# Patient Record
Sex: FEMALE | Race: WHITE | NOT HISPANIC OR LATINO | URBAN - METROPOLITAN AREA
[De-identification: names, ages, dates, MRNs, and addresses within clinical notes are randomized per-mention and may not be internally consistent; named-entity substitution may affect disease eponyms.]

---

## 2019-02-02 ENCOUNTER — EMERGENCY (EMERGENCY)
Facility: HOSPITAL | Age: 29
LOS: 1 days | Discharge: HOME | End: 2019-02-02
Attending: EMERGENCY MEDICINE | Admitting: EMERGENCY MEDICINE
Payer: MEDICAID

## 2019-02-02 VITALS
OXYGEN SATURATION: 100 % | SYSTOLIC BLOOD PRESSURE: 112 MMHG | RESPIRATION RATE: 16 BRPM | DIASTOLIC BLOOD PRESSURE: 74 MMHG | HEART RATE: 72 BPM | TEMPERATURE: 98 F

## 2019-02-02 VITALS
RESPIRATION RATE: 18 BRPM | WEIGHT: 125 LBS | DIASTOLIC BLOOD PRESSURE: 85 MMHG | SYSTOLIC BLOOD PRESSURE: 141 MMHG | HEART RATE: 91 BPM | TEMPERATURE: 98 F | OXYGEN SATURATION: 99 %

## 2019-02-02 DIAGNOSIS — Z79.899 OTHER LONG TERM (CURRENT) DRUG THERAPY: ICD-10-CM

## 2019-02-02 DIAGNOSIS — R10.9 UNSPECIFIED ABDOMINAL PAIN: ICD-10-CM

## 2019-02-02 DIAGNOSIS — R10.32 LEFT LOWER QUADRANT PAIN: ICD-10-CM

## 2019-02-02 LAB
ALBUMIN SERPL ELPH-MCNC: 4.5 G/DL — SIGNIFICANT CHANGE UP (ref 3.3–5)
ALP SERPL-CCNC: 63 U/L — SIGNIFICANT CHANGE UP (ref 40–120)
ALT FLD-CCNC: 18 U/L — SIGNIFICANT CHANGE UP (ref 10–45)
ANION GAP SERPL CALC-SCNC: 13 MMOL/L — SIGNIFICANT CHANGE UP (ref 5–17)
APPEARANCE UR: CLEAR — SIGNIFICANT CHANGE UP
APTT BLD: 33.3 SEC — SIGNIFICANT CHANGE UP (ref 27.5–36.3)
AST SERPL-CCNC: 21 U/L — SIGNIFICANT CHANGE UP (ref 10–40)
BASOPHILS NFR BLD AUTO: 0.5 % — SIGNIFICANT CHANGE UP (ref 0–2)
BILIRUB SERPL-MCNC: 0.4 MG/DL — SIGNIFICANT CHANGE UP (ref 0.2–1.2)
BILIRUB UR-MCNC: NEGATIVE — SIGNIFICANT CHANGE UP
BUN SERPL-MCNC: 16 MG/DL — SIGNIFICANT CHANGE UP (ref 7–23)
CALCIUM SERPL-MCNC: 9.8 MG/DL — SIGNIFICANT CHANGE UP (ref 8.4–10.5)
CHLORIDE SERPL-SCNC: 102 MMOL/L — SIGNIFICANT CHANGE UP (ref 96–108)
CO2 SERPL-SCNC: 24 MMOL/L — SIGNIFICANT CHANGE UP (ref 22–31)
COLOR SPEC: YELLOW — SIGNIFICANT CHANGE UP
CREAT SERPL-MCNC: 0.91 MG/DL — SIGNIFICANT CHANGE UP (ref 0.5–1.3)
DIFF PNL FLD: ABNORMAL
EOSINOPHIL NFR BLD AUTO: 3.4 % — SIGNIFICANT CHANGE UP (ref 0–6)
GLUCOSE SERPL-MCNC: 78 MG/DL — SIGNIFICANT CHANGE UP (ref 70–99)
GLUCOSE UR QL: NEGATIVE — SIGNIFICANT CHANGE UP
HCG SERPL-ACNC: <.1 MIU/ML — SIGNIFICANT CHANGE UP
HCT VFR BLD CALC: 43.7 % — SIGNIFICANT CHANGE UP (ref 34.5–45)
HGB BLD-MCNC: 15.1 G/DL — SIGNIFICANT CHANGE UP (ref 11.5–15.5)
INR BLD: 1.05 — SIGNIFICANT CHANGE UP (ref 0.88–1.16)
KETONES UR-MCNC: 15 MG/DL
LEUKOCYTE ESTERASE UR-ACNC: NEGATIVE — SIGNIFICANT CHANGE UP
LIDOCAIN IGE QN: 25 U/L — SIGNIFICANT CHANGE UP (ref 7–60)
LYMPHOCYTES # BLD AUTO: 31.5 % — SIGNIFICANT CHANGE UP (ref 13–44)
MCHC RBC-ENTMCNC: 28.8 PG — SIGNIFICANT CHANGE UP (ref 27–34)
MCHC RBC-ENTMCNC: 34.6 G/DL — SIGNIFICANT CHANGE UP (ref 32–36)
MCV RBC AUTO: 83.4 FL — SIGNIFICANT CHANGE UP (ref 80–100)
MONOCYTES NFR BLD AUTO: 8.1 % — SIGNIFICANT CHANGE UP (ref 2–14)
NEUTROPHILS NFR BLD AUTO: 56.5 % — SIGNIFICANT CHANGE UP (ref 43–77)
NITRITE UR-MCNC: NEGATIVE — SIGNIFICANT CHANGE UP
PH UR: 6 — SIGNIFICANT CHANGE UP (ref 5–8)
PLATELET # BLD AUTO: 414 K/UL — HIGH (ref 150–400)
POTASSIUM SERPL-MCNC: 4.2 MMOL/L — SIGNIFICANT CHANGE UP (ref 3.5–5.3)
POTASSIUM SERPL-SCNC: 4.2 MMOL/L — SIGNIFICANT CHANGE UP (ref 3.5–5.3)
PROT SERPL-MCNC: 8.4 G/DL — HIGH (ref 6–8.3)
PROT UR-MCNC: NEGATIVE MG/DL — SIGNIFICANT CHANGE UP
PROTHROM AB SERPL-ACNC: 11.9 SEC — SIGNIFICANT CHANGE UP (ref 10–12.9)
RBC # BLD: 5.24 M/UL — HIGH (ref 3.8–5.2)
RBC # FLD: 12.8 % — SIGNIFICANT CHANGE UP (ref 10.3–16.9)
SODIUM SERPL-SCNC: 139 MMOL/L — SIGNIFICANT CHANGE UP (ref 135–145)
SP GR SPEC: >=1.03 — SIGNIFICANT CHANGE UP (ref 1–1.03)
UROBILINOGEN FLD QL: 0.2 E.U./DL — SIGNIFICANT CHANGE UP
WBC # BLD: 8.6 K/UL — SIGNIFICANT CHANGE UP (ref 3.8–10.5)
WBC # FLD AUTO: 8.6 K/UL — SIGNIFICANT CHANGE UP (ref 3.8–10.5)

## 2019-02-02 PROCEDURE — 76857 US EXAM PELVIC LIMITED: CPT | Mod: 26

## 2019-02-02 PROCEDURE — 85610 PROTHROMBIN TIME: CPT

## 2019-02-02 PROCEDURE — 96374 THER/PROPH/DIAG INJ IV PUSH: CPT | Mod: XU

## 2019-02-02 PROCEDURE — 85730 THROMBOPLASTIN TIME PARTIAL: CPT

## 2019-02-02 PROCEDURE — 76830 TRANSVAGINAL US NON-OB: CPT

## 2019-02-02 PROCEDURE — 87086 URINE CULTURE/COLONY COUNT: CPT

## 2019-02-02 PROCEDURE — 85025 COMPLETE CBC W/AUTO DIFF WBC: CPT

## 2019-02-02 PROCEDURE — 87591 N.GONORRHOEAE DNA AMP PROB: CPT

## 2019-02-02 PROCEDURE — 83690 ASSAY OF LIPASE: CPT

## 2019-02-02 PROCEDURE — 81001 URINALYSIS AUTO W/SCOPE: CPT

## 2019-02-02 PROCEDURE — 36415 COLL VENOUS BLD VENIPUNCTURE: CPT

## 2019-02-02 PROCEDURE — 99284 EMERGENCY DEPT VISIT MOD MDM: CPT | Mod: 25

## 2019-02-02 PROCEDURE — 74177 CT ABD & PELVIS W/CONTRAST: CPT

## 2019-02-02 PROCEDURE — 87491 CHLMYD TRACH DNA AMP PROBE: CPT

## 2019-02-02 PROCEDURE — 84702 CHORIONIC GONADOTROPIN TEST: CPT

## 2019-02-02 PROCEDURE — 80053 COMPREHEN METABOLIC PANEL: CPT

## 2019-02-02 PROCEDURE — 74177 CT ABD & PELVIS W/CONTRAST: CPT | Mod: 26

## 2019-02-02 PROCEDURE — 76857 US EXAM PELVIC LIMITED: CPT

## 2019-02-02 PROCEDURE — 76830 TRANSVAGINAL US NON-OB: CPT | Mod: 26

## 2019-02-02 RX ORDER — KETOROLAC TROMETHAMINE 30 MG/ML
30 SYRINGE (ML) INJECTION ONCE
Qty: 0 | Refills: 0 | Status: DISCONTINUED | OUTPATIENT
Start: 2019-02-02 | End: 2019-02-02

## 2019-02-02 RX ORDER — IOHEXOL 300 MG/ML
30 INJECTION, SOLUTION INTRAVENOUS ONCE
Qty: 0 | Refills: 0 | Status: COMPLETED | OUTPATIENT
Start: 2019-02-02 | End: 2019-02-02

## 2019-02-02 RX ORDER — TRAMADOL HYDROCHLORIDE 50 MG/1
50 TABLET ORAL ONCE
Qty: 0 | Refills: 0 | Status: DISCONTINUED | OUTPATIENT
Start: 2019-02-02 | End: 2019-02-02

## 2019-02-02 RX ADMIN — IOHEXOL 30 MILLILITER(S): 300 INJECTION, SOLUTION INTRAVENOUS at 06:48

## 2019-02-02 RX ADMIN — Medication 30 MILLIGRAM(S): at 07:02

## 2019-02-02 RX ADMIN — Medication 30 MILLIGRAM(S): at 05:34

## 2019-02-02 RX ADMIN — TRAMADOL HYDROCHLORIDE 50 MILLIGRAM(S): 50 TABLET ORAL at 07:02

## 2019-02-02 NOTE — ED ADULT NURSE NOTE - CHPI ED NUR SYMPTOMS NEG
no hematuria/no diarrhea/no vomiting/no fever/no dysuria/no blood in stool/no burning urination/no chills/no abdominal distension/no nausea

## 2019-02-02 NOTE — ED ADULT NURSE NOTE - OBJECTIVE STATEMENT
Presents to ED for LLQ pain which began earlier this morning.  Patient reports she was tossing and turning in her sleep due to the pain which then prompted her to come to the hospital.  At the initial time pain began she rated it 8/10, while in ED she rates the pain 4-5/10 but states pain comes in waves.  Denies any hematuria, constipation, diarrhea, blood in stool, incontinence, SOB, CP, N/V.

## 2019-02-02 NOTE — ED PROVIDER NOTE - OBJECTIVE STATEMENT
28F no PMH c/o LLQ abd pain. pt states pain ongoing for past few hours. states sharp in nature. tried tums without relief.  no n/v/d. no fevers. no dizziness. no dysuria, no discharge. no recent travel. no sick contacts.  states has had abd pain in the past but that was more diffuse.

## 2019-02-02 NOTE — ED ADULT NURSE NOTE - NSIMPLEMENTINTERV_GEN_ALL_ED
Implemented All Universal Safety Interventions:  [place] to call system. Call bell, personal items and telephone within reach. Instruct patient to call for assistance. Room bathroom lighting operational. Non-slip footwear when patient is off stretcher. Physically safe environment: no spills, clutter or unnecessary equipment. Stretcher in lowest position, wheels locked, appropriate side rails in place.

## 2019-02-02 NOTE — ED PROVIDER NOTE - PROGRESS NOTE DETAILS
no acute abnormalities on US.  L adnexal tenderness on pelvic exam.  pt states had unprotected sex a week ago.  had discussion with pt regarding possible treatement for PID. pt offered CT (states had unremarkable one a month ago).  pt would like to do CT at this time and hold off on ABX for PID. would like to wait for gc/chlam results. louise: pt received at sign out from dr ceja as pending ct abd- no acute surg findings, constipation and some proctocolitis, pt sleeping comfortably, diet modifications and f/u w/gi or pmd discussed at length

## 2019-02-02 NOTE — ED PROVIDER NOTE - NSFOLLOWUPINSTRUCTIONS_ED_ALL_ED_FT
WHAT YOU NEED TO KNOW:  Constipation is when you have hard, dry bowel movements, or you go longer than usual between bowel movements.   DISCHARGE INSTRUCTIONS:  Return to the emergency department if:   You have blood in your bowel movements.  You have a fever and abdominal pain with the constipation.  Contact your healthcare provider if:   Your constipation gets worse.   You start to vomit.  You have questions or concerns about your condition or care.  Medicines:   Medicine such as a laxative may help relax and loosen your intestines to help you have a bowel movement. Your provider may recommend you only use laxatives for a short time. Long-term use may make your bowels dependent on the medicine.  Take your medicine as directed. Contact your healthcare provider if you think your medicine is not helping or if you have side effects. Tell him of her if you are allergic to any medicine. Keep a list of the medicines, vitamins, and herbs you take. Include the amounts, and when and why you take them. Bring the list or the pill bottles to follow-up visits. Carry your medicine list with you in case of an emergency.  Relieve constipation: A suppository may be used to help soften your bowel movements. This may make them easier to pass. A suppository is guided into your rectum through your anus.Suppository for Constipation   an enema is liquid medicine used to clear bowel movement from your rectum. The medicine is put into your rectum through your anus.Enemas   Prevent constipation:   Drink liquids as directed. You may need to drink extra liquids to help soften and move your bowels. Ask how much liquid to drink each day and which liquids are best for you.   Eat high-fiber foods. This may help decrease constipation by adding bulk to your bowel movements. High-fiber foods include fruit, vegetables, whole-grain breads and cereals, and beans. Your healthcare provider or dietitian can help you create a high-fiber meal plan. Your provider may also recommend a fiber supplement if you cannot get enough fiber from food.   Exercise regularly. Regular physical activity can help stimulate your intestines. Ask which exercises are best for you.      Schedule a time each day to have a bowel movement. This may help train your body to have regular bowel movements. Bend forward while you are on the toilet to help move the bowel movement out. Sit on the toilet for at least 10 minutes, even if you do not have a bowel movement.

## 2019-02-03 LAB
CULTURE RESULTS: SIGNIFICANT CHANGE UP
SPECIMEN SOURCE: SIGNIFICANT CHANGE UP

## 2019-02-04 LAB
C TRACH RRNA SPEC QL NAA+PROBE: DETECTED
N GONORRHOEA RRNA SPEC QL NAA+PROBE: SIGNIFICANT CHANGE UP
SPECIMEN SOURCE: SIGNIFICANT CHANGE UP

## 2019-02-05 NOTE — ED POST DISCHARGE NOTE - DETAILS
mailbox full, unable to leave message. advised one additional attempt, then send letter if unsuccessful pt contacted and aware- encouraged to come back to ed for treatment- pt agreeable to plan

## 2019-02-12 ENCOUNTER — EMERGENCY (EMERGENCY)
Facility: HOSPITAL | Age: 29
LOS: 1 days | Discharge: HOME | End: 2019-02-12
Admitting: EMERGENCY MEDICINE
Payer: MEDICAID

## 2019-02-12 VITALS
HEART RATE: 98 BPM | SYSTOLIC BLOOD PRESSURE: 122 MMHG | RESPIRATION RATE: 18 BRPM | OXYGEN SATURATION: 97 % | TEMPERATURE: 98 F | DIASTOLIC BLOOD PRESSURE: 81 MMHG

## 2019-02-12 DIAGNOSIS — A74.9 CHLAMYDIAL INFECTION, UNSPECIFIED: ICD-10-CM

## 2019-02-12 DIAGNOSIS — Z79.899 OTHER LONG TERM (CURRENT) DRUG THERAPY: ICD-10-CM

## 2019-02-12 DIAGNOSIS — R10.9 UNSPECIFIED ABDOMINAL PAIN: ICD-10-CM

## 2019-02-12 PROCEDURE — 99283 EMERGENCY DEPT VISIT LOW MDM: CPT | Mod: 25

## 2019-02-12 PROCEDURE — 96372 THER/PROPH/DIAG INJ SC/IM: CPT

## 2019-02-12 PROCEDURE — 99283 EMERGENCY DEPT VISIT LOW MDM: CPT

## 2019-02-12 RX ORDER — CEFTRIAXONE 500 MG/1
250 INJECTION, POWDER, FOR SOLUTION INTRAMUSCULAR; INTRAVENOUS ONCE
Qty: 0 | Refills: 0 | Status: COMPLETED | OUTPATIENT
Start: 2019-02-12 | End: 2019-02-12

## 2019-02-12 RX ORDER — AZITHROMYCIN 500 MG/1
1000 TABLET, FILM COATED ORAL ONCE
Qty: 0 | Refills: 0 | Status: COMPLETED | OUTPATIENT
Start: 2019-02-12 | End: 2019-02-12

## 2019-02-12 RX ADMIN — AZITHROMYCIN 1000 MILLIGRAM(S): 500 TABLET, FILM COATED ORAL at 21:55

## 2019-02-12 RX ADMIN — CEFTRIAXONE 250 MILLIGRAM(S): 500 INJECTION, POWDER, FOR SOLUTION INTRAMUSCULAR; INTRAVENOUS at 21:57

## 2019-02-12 NOTE — ED ADULT TRIAGE NOTE - CHIEF COMPLAINT QUOTE
Pt states "I got a call from the MD here saying that I was tested positive for Chlamydia and that I should come in for antibiotics". Denies other complaints

## 2019-02-12 NOTE — ED PROVIDER NOTE - NSFOLLOWUPINSTRUCTIONS_ED_ALL_ED_FT
Chlamydia, Female  Chlamydia is an STD (sexually transmitted disease). It is a bacterial infection that spreads (is contagious) through sexual contact. Chlamydia can occur in different areas of the body, including:    The tube that moves urine from the bladder out of the body (urethra).  The lower part of the uterus (cervix).  The throat.  The rectum.    ImageThis condition is not difficult to treat. However, if left untreated, chlamydia can lead to more serious health problems, including pelvic inflammatory disorder (PID). PID can increase your risk of not being able to have children (sterility).    What are the causes?  Chlamydia is caused by the bacteria Chlamydia trachomatis. It is passed from an infected partner during sexual activity. Chlamydia can spread through contact with the genitals, mouth, or rectum.    What are the signs or symptoms?  In some cases, there may not be any symptoms for this condition (asymptomatic), especially early in the infection. If symptoms develop, they may include:    Burning with urination.  Frequent urination.  Vaginal discharge.  Redness, soreness, and swelling (inflammation) of the rectum.  Bleeding or discharge from the rectum.  Abdominal pain.  Pain during sexual intercourse.  Bleeding between menstrual periods.  Itching, burning, or redness in the eyes, or discharge from the eyes.    How is this diagnosed?  This condition may be diagnosed with:    Urine tests.  Swab tests. Depending on your symptoms, your health care provider may use a cotton swab to collect discharge from your vagina or rectum to test for the bacteria.  A pelvic exam.    How is this treated?  This condition is treated with antibiotic medicines. If you are pregnant, certain types of antibiotics will need to be avoided.    Follow these instructions at home:  Medicines     Take over-the-counter and prescription medicines only as told by your health care provider.  Take your antibiotic medicine as told by your health care provider. Do not stop taking the antibiotic even if you start to feel better.  Sexual activity     Tell sexual partners about your infection. This includes any oral, anal, or vaginal sex partners you have had within 60 days of when your symptoms started. Sexual partners should also be treated, even if they have no signs of the disease.  Do not have sex until you and your sexual partners have completed treatment and your health care provider says it is okay. If your health care provider prescribed you a single dose treatment, wait 7 days after taking the treatment before having sex.  General instructions     It is your responsibility to get your test results. Ask your health care provider, or the department performing the test, when your results will be ready.  Get plenty of rest.  Eat a healthy, well-balanced diet.  Drink enough fluids to keep your urine clear or pale yellow.  Keep all follow-up visits as told by your health care provider. This is important. You may need to be tested for infection again 3 months after treatment.  How is this prevented?  The only sure way to prevent chlamydia is to avoid having sex. However, you can lower your risk by:    Using latex condoms correctly every time you have sex.  Not having multiple sexual partners.  Asking if your sexual partner has been tested for STIs and had negative results.    Contact a health care provider if:  You develop new symptoms or your symptoms do not get better after completing treatment.  You have a fever or chills.  You have pain during sexual intercourse.  Get help right away if:  Your pain gets worse and does not get better with medicine.  You develop flu-like symptoms, such as night sweats, sore throat, or muscle aches.  You experience nausea or vomiting.  You have difficulty swallowing.  You have bleeding between periods or after sex.  You have irregular menstrual periods.  You have abdominal or lower back pain that does not get better with medicine.  You feel weak or dizzy, or you faint.  You are pregnant and you develop symptoms of chlamydia.  Summary  Chlamydia is an STD (sexually transmitted disease). It is a bacterial infection that spreads (is contagious) through sexual contact.  This condition is not difficult to treat, however. If left untreated, chlamydia can lead to more serious health problems, including pelvic inflammatory disease (PID).  In some cases, there may not be any symptoms for this condition (asymptomatic).  This condition is treated with antibiotic medicines.  Using latex condoms correctly every time you have sex can help prevent chlamydia.  This information is not intended to replace advice given to you by your health care provider. Make sure you discuss any questions you have with your health care provider.

## 2019-02-12 NOTE — ED PROVIDER NOTE - PHYSICAL EXAMINATION
General: Patient is well developed and well nourised. Patient is alert and oriented to person, place and date. Patient is laying comfortably in stretcher and appears in no acute distress.  HEENT: Head is normocephalic and atraumatic. Pupils are equal, round and reactive. Extraocular movements intact.  Lungs: Equal chest expansion. No note of retractions.  Abdomen: Bowel sounds present in all four quadrants. Soft, non-tender, non-distended without signs of masses, rebound or guarding. No note of hepatosplenomegaly. No CVA tenderness bilaterally. Negative Smith sign. No pain present over McBurney's point.  Musculoskeletal: No edema, erythema, ecchymosis, atrophy or deformity. Full range of motion in all four extremities.  No clubbing or cyanosis. No point tenderness to palpation.   Neuro: GCS 15. Moving all extremities without discomfort. gait steady   Skin: Warm, dry and intact without evidence of rashes, bruising, pallor, jaundice or cyanosis.   Psych: Mood and affect appropriate.

## 2019-02-12 NOTE — ED PROVIDER NOTE - CLINICAL SUMMARY MEDICAL DECISION MAKING FREE TEXT BOX
This is a pleasant 28 year old female presenting to the ed for treatment of chlamydia. patient was called this evening and notified she was positive. no changes in symptoms, abdominal pain, fevers or discharge. I reviewed the patient's prior ed visit which patient had negative findings for tuboovarian abscess on ct and us. patient is medicated and encouraged to remain abstinent and follow up with obgyn. ED evaluation and management discussed with the patient and family (if available) in detail.  Close PMD follow up encouraged.  Strict ED return instructions discussed in detail and patient given the opportunity to ask any questions about their discharge diagnosis and instructions. Patient verbalized understanding. Patient is agreeable to plan.

## 2019-02-12 NOTE — ED PROVIDER NOTE - OBJECTIVE STATEMENT
Patient seen and evaluated on 2/2 for abdominal patient. patient was contacted today that she was positive for chlamydia. states that prior to ed eval 2/2, she had unprotected intercourse 1 week prior. states that her abdominal pain has otherwise subsided, no painful urination change in vaginal discharge. states that she is otherwise feeling well and here for treatment.

## 2019-02-12 NOTE — ED ADULT NURSE NOTE - NSIMPLEMENTINTERV_GEN_ALL_ED
Implemented All Universal Safety Interventions:  [place] to call system. Call bell, personal items and telephone within reach. Instruct patient to call for assistance. Room bathroom lighting operational. Non-slip footwear when patient is off stretcher. Physically safe environment: no spills, clutter or unnecessary equipment. Stretcher in lowest position, wheels locked, appropriate side rails in place.

## 2019-12-02 ENCOUNTER — INPATIENT (INPATIENT)
Facility: HOSPITAL | Age: 29
LOS: 2 days | Discharge: HOME | DRG: 872 | End: 2019-12-05
Attending: INTERNAL MEDICINE | Admitting: INTERNAL MEDICINE
Payer: COMMERCIAL

## 2019-12-02 VITALS
WEIGHT: 129.19 LBS | OXYGEN SATURATION: 98 % | DIASTOLIC BLOOD PRESSURE: 78 MMHG | SYSTOLIC BLOOD PRESSURE: 116 MMHG | RESPIRATION RATE: 18 BRPM | TEMPERATURE: 102 F | HEIGHT: 64 IN | HEART RATE: 120 BPM

## 2019-12-02 DIAGNOSIS — F32.9 MAJOR DEPRESSIVE DISORDER, SINGLE EPISODE, UNSPECIFIED: ICD-10-CM

## 2019-12-02 DIAGNOSIS — Z91.89 OTHER SPECIFIED PERSONAL RISK FACTORS, NOT ELSEWHERE CLASSIFIED: ICD-10-CM

## 2019-12-02 DIAGNOSIS — N12 TUBULO-INTERSTITIAL NEPHRITIS, NOT SPECIFIED AS ACUTE OR CHRONIC: ICD-10-CM

## 2019-12-02 DIAGNOSIS — F41.9 ANXIETY DISORDER, UNSPECIFIED: ICD-10-CM

## 2019-12-02 DIAGNOSIS — R63.8 OTHER SYMPTOMS AND SIGNS CONCERNING FOOD AND FLUID INTAKE: ICD-10-CM

## 2019-12-02 DIAGNOSIS — Z86.19 PERSONAL HISTORY OF OTHER INFECTIOUS AND PARASITIC DISEASES: ICD-10-CM

## 2019-12-02 DIAGNOSIS — A41.9 SEPSIS, UNSPECIFIED ORGANISM: ICD-10-CM

## 2019-12-02 LAB — HCG SERPL-ACNC: <0 MIU/ML — SIGNIFICANT CHANGE UP

## 2019-12-02 PROCEDURE — 93010 ELECTROCARDIOGRAM REPORT: CPT | Mod: NC

## 2019-12-02 PROCEDURE — 99285 EMERGENCY DEPT VISIT HI MDM: CPT

## 2019-12-02 PROCEDURE — 76705 ECHO EXAM OF ABDOMEN: CPT | Mod: 26

## 2019-12-02 PROCEDURE — 74176 CT ABD & PELVIS W/O CONTRAST: CPT | Mod: 26

## 2019-12-02 RX ORDER — INFLUENZA VIRUS VACCINE 15; 15; 15; 15 UG/.5ML; UG/.5ML; UG/.5ML; UG/.5ML
0.5 SUSPENSION INTRAMUSCULAR ONCE
Refills: 0 | Status: COMPLETED | OUTPATIENT
Start: 2019-12-02 | End: 2019-12-02

## 2019-12-02 RX ORDER — ACETAMINOPHEN 500 MG
650 TABLET ORAL EVERY 6 HOURS
Refills: 0 | Status: DISCONTINUED | OUTPATIENT
Start: 2019-12-02 | End: 2019-12-05

## 2019-12-02 RX ORDER — ACETAMINOPHEN 500 MG
650 TABLET ORAL ONCE
Refills: 0 | Status: COMPLETED | OUTPATIENT
Start: 2019-12-02 | End: 2019-12-02

## 2019-12-02 RX ORDER — SODIUM CHLORIDE 9 MG/ML
1000 INJECTION INTRAMUSCULAR; INTRAVENOUS; SUBCUTANEOUS
Refills: 0 | Status: DISCONTINUED | OUTPATIENT
Start: 2019-12-02 | End: 2019-12-04

## 2019-12-02 RX ORDER — ACETAMINOPHEN 500 MG
325 TABLET ORAL ONCE
Refills: 0 | Status: COMPLETED | OUTPATIENT
Start: 2019-12-02 | End: 2019-12-02

## 2019-12-02 RX ORDER — BUPROPION HYDROCHLORIDE 150 MG/1
150 TABLET, EXTENDED RELEASE ORAL
Qty: 0 | Refills: 0 | DISCHARGE

## 2019-12-02 RX ORDER — VENLAFAXINE HCL 75 MG
1 CAPSULE, EXT RELEASE 24 HR ORAL
Qty: 0 | Refills: 0 | DISCHARGE

## 2019-12-02 RX ORDER — CEFTRIAXONE 500 MG/1
1000 INJECTION, POWDER, FOR SOLUTION INTRAMUSCULAR; INTRAVENOUS ONCE
Refills: 0 | Status: COMPLETED | OUTPATIENT
Start: 2019-12-02 | End: 2019-12-02

## 2019-12-02 RX ORDER — BUPROPION HYDROCHLORIDE 150 MG/1
0 TABLET, EXTENDED RELEASE ORAL
Qty: 0 | Refills: 0 | DISCHARGE

## 2019-12-02 RX ORDER — METHYLPHENIDATE HCL 5 MG
1 TABLET ORAL
Qty: 0 | Refills: 0 | DISCHARGE

## 2019-12-02 RX ORDER — SODIUM CHLORIDE 9 MG/ML
1000 INJECTION INTRAMUSCULAR; INTRAVENOUS; SUBCUTANEOUS ONCE
Refills: 0 | Status: COMPLETED | OUTPATIENT
Start: 2019-12-02 | End: 2019-12-02

## 2019-12-02 RX ORDER — ENOXAPARIN SODIUM 100 MG/ML
40 INJECTION SUBCUTANEOUS EVERY 24 HOURS
Refills: 0 | Status: DISCONTINUED | OUTPATIENT
Start: 2019-12-03 | End: 2019-12-03

## 2019-12-02 RX ORDER — MORPHINE SULFATE 50 MG/1
4 CAPSULE, EXTENDED RELEASE ORAL ONCE
Refills: 0 | Status: DISCONTINUED | OUTPATIENT
Start: 2019-12-02 | End: 2019-12-02

## 2019-12-02 RX ORDER — KETOROLAC TROMETHAMINE 30 MG/ML
15 SYRINGE (ML) INJECTION ONCE
Refills: 0 | Status: DISCONTINUED | OUTPATIENT
Start: 2019-12-02 | End: 2019-12-02

## 2019-12-02 RX ORDER — CEFTRIAXONE 500 MG/1
1000 INJECTION, POWDER, FOR SOLUTION INTRAMUSCULAR; INTRAVENOUS EVERY 24 HOURS
Refills: 0 | Status: DISCONTINUED | OUTPATIENT
Start: 2019-12-03 | End: 2019-12-04

## 2019-12-02 RX ADMIN — Medication 325 MILLIGRAM(S): at 20:22

## 2019-12-02 RX ADMIN — CEFTRIAXONE 100 MILLIGRAM(S): 500 INJECTION, POWDER, FOR SOLUTION INTRAMUSCULAR; INTRAVENOUS at 11:29

## 2019-12-02 RX ADMIN — Medication 325 MILLIGRAM(S): at 18:36

## 2019-12-02 RX ADMIN — SODIUM CHLORIDE 1000 MILLILITER(S): 9 INJECTION INTRAMUSCULAR; INTRAVENOUS; SUBCUTANEOUS at 11:53

## 2019-12-02 RX ADMIN — Medication 15 MILLIGRAM(S): at 12:56

## 2019-12-02 RX ADMIN — SODIUM CHLORIDE 1000 MILLILITER(S): 9 INJECTION INTRAMUSCULAR; INTRAVENOUS; SUBCUTANEOUS at 10:44

## 2019-12-02 RX ADMIN — Medication 650 MILLIGRAM(S): at 11:24

## 2019-12-02 RX ADMIN — CEFTRIAXONE 1000 MILLIGRAM(S): 500 INJECTION, POWDER, FOR SOLUTION INTRAMUSCULAR; INTRAVENOUS at 11:53

## 2019-12-02 RX ADMIN — MORPHINE SULFATE 4 MILLIGRAM(S): 50 CAPSULE, EXTENDED RELEASE ORAL at 11:52

## 2019-12-02 RX ADMIN — SODIUM CHLORIDE 1000 MILLILITER(S): 9 INJECTION INTRAMUSCULAR; INTRAVENOUS; SUBCUTANEOUS at 10:49

## 2019-12-02 RX ADMIN — Medication 15 MILLIGRAM(S): at 15:31

## 2019-12-02 RX ADMIN — SODIUM CHLORIDE 75 MILLILITER(S): 9 INJECTION INTRAMUSCULAR; INTRAVENOUS; SUBCUTANEOUS at 22:49

## 2019-12-02 RX ADMIN — Medication 650 MILLIGRAM(S): at 20:22

## 2019-12-02 RX ADMIN — MORPHINE SULFATE 4 MILLIGRAM(S): 50 CAPSULE, EXTENDED RELEASE ORAL at 11:20

## 2019-12-02 RX ADMIN — MORPHINE SULFATE 4 MILLIGRAM(S): 50 CAPSULE, EXTENDED RELEASE ORAL at 15:38

## 2019-12-02 RX ADMIN — Medication 650 MILLIGRAM(S): at 18:36

## 2019-12-02 RX ADMIN — Medication 650 MILLIGRAM(S): at 15:31

## 2019-12-02 NOTE — H&P ADULT - PROBLEM SELECTOR PLAN 3
High risk sexual behavior, history of clamydia that was treated. No vaginal discharge recently  - less likely Andreas Albert given treatment and no evidence seen on the CT abd pelvis  - counseled on consistent condom use

## 2019-12-02 NOTE — ED PROVIDER NOTE - CLINICAL SUMMARY MEDICAL DECISION MAKING FREE TEXT BOX
febrile. hds. no hypoxia. found to have sepsis likely 2/2 pyelo. s/p ctx/ivf. lactate wnl. ruq u/s w/ gb sludge but no e/o acute biliary disease. LFTs wnl. lipase neg. R renal u/s w/ hydronephrosis. ct a/p noncon w/o e/o obstructing stone. less likely moe snyder given tx chlamydia and no e/o on us/ct. pain improved although persistent s/p morphine/toradol. will admit to dr dacosta as per reccs.

## 2019-12-02 NOTE — PROGRESS NOTE ADULT - SUBJECTIVE AND OBJECTIVE BOX
30yo with no significant medical history complaining of progressively increasing RLQ and later flank pain over 3 days.  Pt had no fever until this AM and did note urinary frequency without urgency or dysuria but had been drinking a lot as appetite for food was diminished.   She was seen in the office this AM and referred to ER when UA + WBD and white count 20,000.   On PE Temp was 101.6  /70 abdomen tender in RLQ and midabdomen with mild CVAT.   UA +nitrites  WBCs   Sono + hydronephrosis R kidney

## 2019-12-02 NOTE — ED PROVIDER NOTE - OBJECTIVE STATEMENT
28F +chlaymdia s/p tx (2/12/19), otherwise healthy, c/o 3d progressively worsening sharp ruq pain radiating to R flank worse w/ deep inspiration. +urinary frequency. +nausea. +generalized weakness. +myalgias. no fever/chills, no uri/cough, no cp/sob, no n/v, no diarrhea/hematochezia/melena, no dysuria/hematuria, no vaginal spotting/discharge, no interim unprotected intercourse, no pelvic pain, no phx nephrolithiasis. 28F +chlaymdia s/p tx (2/12/19), otherwise healthy, c/o 3d progressively worsening sharp ruq pain radiating to R flank worse w/ deep inspiration. +urinary frequency. +nausea. +generalized weakness. +myalgias. no fever/chills, no uri/cough, no cp/sob, no n/v, no diarrhea/hematochezia/melena, no dysuria/hematuria, no vaginal spotting/discharge, no interim unprotected intercourse, no pelvic pain, no phx nephrolithiasis.    pcp: jani dacosta 28F +chlaymdia (2/12/19) s/p tx w/ 2 negative std tests afterwards w/o interim high risk sexual activity, otherwise healthy, c/o 3d progressively worsening sharp ruq pain radiating to R flank worse w/ deep inspiration. +urinary frequency. +nausea. +generalized weakness. +myalgias. no fever/chills, no uri/cough, no cp/sob, no n/v, no diarrhea/hematochezia/melena, no dysuria/hematuria, no vaginal spotting/discharge, no interim unprotected intercourse, no pelvic pain, no phx nephrolithiasis.    pcp: jani dacosta

## 2019-12-02 NOTE — H&P ADULT - PROBLEM SELECTOR PLAN 6
F: s/p 2L NS   E: replete as necessary   N: regular diet    Code status: full  DVT prophylaxis: Lovenox 40mg daily   GI prophylaxis: none

## 2019-12-02 NOTE — H&P ADULT - NSHPSOCIALHISTORY_GEN_ALL_CORE
non smoker, social drinking, cocaine and marajuana in the past, none currently  sexually active with inconsistent condom use and birth control with multiple partners

## 2019-12-02 NOTE — H&P ADULT - HISTORY OF PRESENT ILLNESS
28F with PMHx of anxiety, depression presents with 3 days of acute onset abdominal pain in the RLQ that has begun to radiate to the right flank worse with inspiration. She was in her normal state of health last week aside from some foul smelling urine that she thought nothing of until Saturday where she felt urinary frequency, nausea, chills and generalized weakness. Denies any URI symptoms, cp, sob, palpitations, LE swelling/rashes, dysuria/hematochezia, diarrhea/hematochezia/melena. She denies any history of kidney stones, recent travel, sick contacts, vaginal discharge or spotting. Of note she was treated for clamydia in February of 2019 with negative stds. She has multiple sexual partners with inconsistent condom use.     In the ED- VS were T103, -124, //74, RR 18, SPO2 100% on RA  Labs significant for WBC 23 with neutrophilic predominance  US abd showed hepatomegaly, sludge, and mild right hydronephrosis   CT abd pelvis non con showed no evidence of stone or obstruction with possible passed stone and confirmed mild R sighted hydronephrosis.   Given 1L NS , 15mg ketorolac, Morphine 4m, and Tylenol 1g and admitted for further management.

## 2019-12-02 NOTE — H&P ADULT - NSHPPHYSICALEXAM_GEN_ALL_CORE
.  VITAL SIGNS:  T(C): 37.8 (12-02-19 @ 20:31), Max: 39.4 (12-02-19 @ 18:15)  T(F): 100 (12-02-19 @ 20:31), Max: 103 (12-02-19 @ 18:15)  HR: 98 (12-02-19 @ 20:31) (98 - 124)  BP: 120/80 (12-02-19 @ 19:24) (104/68 - 120/80)  BP(mean): --  RR: 16 (12-02-19 @ 19:24) (16 - 18)  SpO2: 98% (12-02-19 @ 19:24) (98% - 100%)  Wt(kg): --    PHYSICAL EXAM:    Constitutional: young female in NAD   Head: NC/AT  Eyes: PERRL, EOMI, anicteric sclera  ENT: no nasal discharge; uvula midline, no oropharyngeal erythema or exudates; MMM  Neck: supple; no JVD or thyromegaly  Respiratory: CTA B/L; no W/R/R, no retractions  Cardiac: +S1/S2; RRR; no M/R/G; PMI non-displaced  Gastrointestinal: abdomen soft, NT/ND; no rebound or guarding; +BSx4  Back: spine midline, no bony tenderness or step-offs; no CVAT B/L  Extremities: WWP, no clubbing or cyanosis; no peripheral edema  Musculoskeletal: NROM x4; no joint swelling, tenderness or erythema  Vascular: 2+ radial, femoral, DP/PT pulses B/L  Dermatologic: skin warm, dry and intact; no rashes, wounds, or scars  Lymphatic: no submandibular or cervical LAD  Neurologic: AAOx3; CNII-XII grossly intact; no focal deficits  Psychiatric: affect and characteristics of appearance, verbalizations, behaviors are appropriate

## 2019-12-02 NOTE — H&P ADULT - PROBLEM SELECTOR PLAN 7
1) PCP   Contacted on Admission: yes   2) Date of Contact with PCP: 12/2  3) PCP Contacted at Discharge: (Y/N)  4) Summary of Handoff Given to PCP:   5) Post-Discharge Appointment Date and Location:

## 2019-12-02 NOTE — ED PROVIDER NOTE - PHYSICAL EXAMINATION
CONST: nontoxic NAD speaking in full sentences  HEAD: atraumatic  EYES: conjunctivae clear  ENT: mmm  NECK: supple  CARD: rrr no murmurs  CHEST: ctab no r/r/w, no stridor/retractions/tripoding  ABD: soft, nd, +ttp ruq, no rebound/guarding, +R cvat  EXT: FROM, symmetric distal pulses intact  SKIN: warm, dry, no rash, no pedal edema, cap refill <2sec  NEURO: a+ox3, 5/5 strength x4, gross sensation intact x4, normal gait

## 2019-12-02 NOTE — PROGRESS NOTE ADULT - ASSESSMENT
UTI pyelonephritis   R/O obstructive stone vs hydro due to inflammation   For CT scan and antibiotic rx

## 2019-12-02 NOTE — ED PROVIDER NOTE - CARE PLAN
Principal Discharge DX:	Pyelonephritis Principal Discharge DX:	Sepsis  Secondary Diagnosis:	Pyelonephritis  Secondary Diagnosis:	Intractable pain

## 2019-12-02 NOTE — H&P ADULT - PROBLEM SELECTOR PLAN 1
Febrile and tachycardic with abdominal pain and +CVA tenderness. CT confirming mild right hydronephrosis with no evidence of stone or cause of obstruction.   - lactate negative  - no travel history or risk factors for MDR organism  - c/w Ceftriaxone 1g q24 given mild evidence of hydronephrosis, hemodynamic stability, and clinically well appearing.

## 2019-12-02 NOTE — H&P ADULT - ASSESSMENT
28F with PMHx of anxiety, depression presents with 3 days of acute onset abdominal pain found to have right sided hydronephrosis.

## 2019-12-02 NOTE — ED ADULT NURSE NOTE - OBJECTIVE STATEMENT
Pt is a 27 y/o female who came in via front triage for evaluation of RLQ abdominal pain, Pt was febrile at triage and tachycardic, sepsis code. Pt placed on cardiac monitor, labs sent, EKG done, fluids started.

## 2019-12-02 NOTE — ED ADULT TRIAGE NOTE - CHIEF COMPLAINT QUOTE
patient complains of right sided abdominal pain since saturday and traveled to the back today. went to Dr Kirk and WBC today was 22.4. sent to r.o kidney infection. denies dysuria

## 2019-12-02 NOTE — H&P ADULT - NSHPLABSRESULTS_GEN_ALL_CORE
.  LABS:                         15.3   23.05 )-----------( 388      ( 02 Dec 2019 10:47 )             47.0         136  |  97  |  10  ----------------------------<  101<H>  3.9   |  26  |  1.11    Ca    9.3      02 Dec 2019 11:14    TPro  8.8<H>  /  Alb  4.6  /  TBili  0.6  /  DBili  x   /  AST  19  /  ALT  15  /  AlkPhos  84      PT/INR - ( 02 Dec 2019 10:47 )   PT: 12.8 sec;   INR: 1.13       PTT - ( 02 Dec 2019 10:47 )  PTT:31.4 sec  Urinalysis Basic - ( 02 Dec 2019 10:47 )    Color: Yellow / Appearance: Cloudy / S.025 / pH: x  Gluc: x / Ketone: 15 mg/dL  / Bili: Negative / Urobili: 0.2 E.U./dL   Blood: x / Protein: 100 mg/dL / Nitrite: POSITIVE   Leuk Esterase: Moderate / RBC: 5-10 /HPF / WBC Many /HPF   Sq Epi: x / Non Sq Epi: 5-10 /HPF / Bacteria: Many /HPF      Lactate, Blood: 1.3 mmoL/L ( @ 10:47)    RADIOLOGY, EKG & ADDITIONAL TESTS: Reviewed.

## 2019-12-03 ENCOUNTER — TRANSCRIPTION ENCOUNTER (OUTPATIENT)
Age: 29
End: 2019-12-03

## 2019-12-03 DIAGNOSIS — Z29.9 ENCOUNTER FOR PROPHYLACTIC MEASURES, UNSPECIFIED: ICD-10-CM

## 2019-12-03 DIAGNOSIS — R52 PAIN, UNSPECIFIED: ICD-10-CM

## 2019-12-03 LAB
ANION GAP SERPL CALC-SCNC: 10 MMOL/L — SIGNIFICANT CHANGE UP (ref 5–17)
BASOPHILS # BLD AUTO: 0.04 K/UL — SIGNIFICANT CHANGE UP (ref 0–0.2)
BASOPHILS NFR BLD AUTO: 0.2 % — SIGNIFICANT CHANGE UP (ref 0–2)
BUN SERPL-MCNC: 8 MG/DL — SIGNIFICANT CHANGE UP (ref 7–23)
CALCIUM SERPL-MCNC: 8 MG/DL — LOW (ref 8.4–10.5)
CHLORIDE SERPL-SCNC: 105 MMOL/L — SIGNIFICANT CHANGE UP (ref 96–108)
CO2 SERPL-SCNC: 24 MMOL/L — SIGNIFICANT CHANGE UP (ref 22–31)
CREAT SERPL-MCNC: 0.92 MG/DL — SIGNIFICANT CHANGE UP (ref 0.5–1.3)
EOSINOPHIL # BLD AUTO: 0.01 K/UL — SIGNIFICANT CHANGE UP (ref 0–0.5)
EOSINOPHIL NFR BLD AUTO: 0.1 % — SIGNIFICANT CHANGE UP (ref 0–6)
GLUCOSE SERPL-MCNC: 99 MG/DL — SIGNIFICANT CHANGE UP (ref 70–99)
HCT VFR BLD CALC: 38.7 % — SIGNIFICANT CHANGE UP (ref 34.5–45)
HGB BLD-MCNC: 12.4 G/DL — SIGNIFICANT CHANGE UP (ref 11.5–15.5)
IMM GRANULOCYTES NFR BLD AUTO: 0.6 % — SIGNIFICANT CHANGE UP (ref 0–1.5)
LYMPHOCYTES # BLD AUTO: 1.11 K/UL — SIGNIFICANT CHANGE UP (ref 1–3.3)
LYMPHOCYTES # BLD AUTO: 6.9 % — LOW (ref 13–44)
MAGNESIUM SERPL-MCNC: 1.8 MG/DL — SIGNIFICANT CHANGE UP (ref 1.6–2.6)
MCHC RBC-ENTMCNC: 27.8 PG — SIGNIFICANT CHANGE UP (ref 27–34)
MCHC RBC-ENTMCNC: 32 GM/DL — SIGNIFICANT CHANGE UP (ref 32–36)
MCV RBC AUTO: 86.8 FL — SIGNIFICANT CHANGE UP (ref 80–100)
MONOCYTES # BLD AUTO: 1.24 K/UL — HIGH (ref 0–0.9)
MONOCYTES NFR BLD AUTO: 7.7 % — SIGNIFICANT CHANGE UP (ref 2–14)
NEUTROPHILS # BLD AUTO: 13.51 K/UL — HIGH (ref 1.8–7.4)
NEUTROPHILS NFR BLD AUTO: 84.5 % — HIGH (ref 43–77)
NRBC # BLD: 0 /100 WBCS — SIGNIFICANT CHANGE UP (ref 0–0)
PLATELET # BLD AUTO: 259 K/UL — SIGNIFICANT CHANGE UP (ref 150–400)
POTASSIUM SERPL-MCNC: 3.6 MMOL/L — SIGNIFICANT CHANGE UP (ref 3.5–5.3)
POTASSIUM SERPL-SCNC: 3.6 MMOL/L — SIGNIFICANT CHANGE UP (ref 3.5–5.3)
RBC # BLD: 4.46 M/UL — SIGNIFICANT CHANGE UP (ref 3.8–5.2)
RBC # FLD: 12.8 % — SIGNIFICANT CHANGE UP (ref 10.3–14.5)
SODIUM SERPL-SCNC: 139 MMOL/L — SIGNIFICANT CHANGE UP (ref 135–145)
WBC # BLD: 16.01 K/UL — HIGH (ref 3.8–10.5)
WBC # FLD AUTO: 16.01 K/UL — HIGH (ref 3.8–10.5)

## 2019-12-03 RX ORDER — OXYCODONE AND ACETAMINOPHEN 5; 325 MG/1; MG/1
1 TABLET ORAL ONCE
Refills: 0 | Status: DISCONTINUED | OUTPATIENT
Start: 2019-12-03 | End: 2019-12-03

## 2019-12-03 RX ORDER — ACETAMINOPHEN 500 MG
650 TABLET ORAL EVERY 6 HOURS
Refills: 0 | Status: DISCONTINUED | OUTPATIENT
Start: 2019-12-03 | End: 2019-12-05

## 2019-12-03 RX ORDER — KETOROLAC TROMETHAMINE 30 MG/ML
10 SYRINGE (ML) INJECTION EVERY 6 HOURS
Refills: 0 | Status: DISCONTINUED | OUTPATIENT
Start: 2019-12-03 | End: 2019-12-05

## 2019-12-03 RX ORDER — POTASSIUM CHLORIDE 20 MEQ
20 PACKET (EA) ORAL ONCE
Refills: 0 | Status: COMPLETED | OUTPATIENT
Start: 2019-12-03 | End: 2019-12-03

## 2019-12-03 RX ORDER — POLYETHYLENE GLYCOL 3350 17 G/17G
17 POWDER, FOR SOLUTION ORAL DAILY
Refills: 0 | Status: DISCONTINUED | OUTPATIENT
Start: 2019-12-03 | End: 2019-12-05

## 2019-12-03 RX ORDER — MORPHINE SULFATE 50 MG/1
2 CAPSULE, EXTENDED RELEASE ORAL ONCE
Refills: 0 | Status: DISCONTINUED | OUTPATIENT
Start: 2019-12-03 | End: 2019-12-03

## 2019-12-03 RX ADMIN — Medication 650 MILLIGRAM(S): at 10:21

## 2019-12-03 RX ADMIN — Medication 650 MILLIGRAM(S): at 02:30

## 2019-12-03 RX ADMIN — Medication 10 MILLIGRAM(S): at 11:18

## 2019-12-03 RX ADMIN — Medication 10 MILLIGRAM(S): at 17:43

## 2019-12-03 RX ADMIN — MORPHINE SULFATE 2 MILLIGRAM(S): 50 CAPSULE, EXTENDED RELEASE ORAL at 02:30

## 2019-12-03 RX ADMIN — Medication 20 MILLIEQUIVALENT(S): at 09:15

## 2019-12-03 RX ADMIN — OXYCODONE AND ACETAMINOPHEN 1 TABLET(S): 5; 325 TABLET ORAL at 18:45

## 2019-12-03 RX ADMIN — ENOXAPARIN SODIUM 40 MILLIGRAM(S): 100 INJECTION SUBCUTANEOUS at 06:26

## 2019-12-03 RX ADMIN — CEFTRIAXONE 100 MILLIGRAM(S): 500 INJECTION, POWDER, FOR SOLUTION INTRAMUSCULAR; INTRAVENOUS at 11:54

## 2019-12-03 RX ADMIN — Medication 10 MILLIGRAM(S): at 16:37

## 2019-12-03 RX ADMIN — OXYCODONE AND ACETAMINOPHEN 1 TABLET(S): 5; 325 TABLET ORAL at 19:49

## 2019-12-03 RX ADMIN — MORPHINE SULFATE 2 MILLIGRAM(S): 50 CAPSULE, EXTENDED RELEASE ORAL at 02:07

## 2019-12-03 RX ADMIN — Medication 10 MILLIGRAM(S): at 10:22

## 2019-12-03 RX ADMIN — Medication 650 MILLIGRAM(S): at 09:28

## 2019-12-03 RX ADMIN — POLYETHYLENE GLYCOL 3350 17 GRAM(S): 17 POWDER, FOR SOLUTION ORAL at 18:45

## 2019-12-03 RX ADMIN — Medication 650 MILLIGRAM(S): at 02:07

## 2019-12-03 NOTE — DISCHARGE NOTE PROVIDER - NSDCFUADDAPPT_GEN_ALL_CORE_FT
Please schedule a follow up appointment with your primary care doctor, Dr. Maximiliano Kirk, for ongoing management of your care.

## 2019-12-03 NOTE — PROGRESS NOTE ADULT - ASSESSMENT
UTI-pyelo responding to antibiotics.  Pain control primary issue Started on ketorolac po but not much response  To continue ABX and young[[portive rx

## 2019-12-03 NOTE — PROGRESS NOTE ADULT - PROBLEM SELECTOR PLAN 3
RESOLVED - Pain requiring morphine in ED, currently controlled with Tylenol  - Treatment as above  - May consider morphine if intractable pain returns

## 2019-12-03 NOTE — DISCHARGE NOTE PROVIDER - NSDCCPCAREPLAN_GEN_ALL_CORE_FT
PRINCIPAL DISCHARGE DIAGNOSIS  Diagnosis: Sepsis  Assessment and Plan of Treatment: At time of admission, you were septic. Sepsis is a serious condition in which bacteria accumulates within the blood or other body tissues, resulting in a severe and potentially life threatening inflamatory state. In your case, your condition was caused by a severe urinary tract infection which spread to your kidney. There was concern that this condition may have been caused by a kidney stone, however after an ultrasound and CT scan were performed, no stone was found. You were treated for this infection with an IV antibiotic called ceftriaxone, and were then transitioned to an antibiotic taken by mouth. This was done following a lab test of your urine to determine which antibiotic would best treat the infection. Please continue to take this medication for the full prescribed course to fully treat the infection, and to minimize the risk of the infection worsening and becoming resistant to antibiotics.   If you begin to experience worsening of your increased urination, experience painful urination, or see blood or mucus in your urine, please speak with your doctor, as this may indicate a worsening of your infection. If you develop a high fever, or feel as though you may lose consiousnes, or feel substantially worse, please go to the closest emergency room for further evaluation and treatment as this may indicate worsening sepsis.      SECONDARY DISCHARGE DIAGNOSES  Diagnosis: Intractable pain  Assessment and Plan of Treatment:     Diagnosis: Pyelonephritis  Assessment and Plan of Treatment:

## 2019-12-03 NOTE — PROGRESS NOTE ADULT - ASSESSMENT
28F admitted with increased urination with urinary discharge, found to have positive Rt. CVAT positive UA and mild right hydronephrosis, admitted to medicine for IV antibiotics.

## 2019-12-03 NOTE — PROGRESS NOTE ADULT - PROBLEM SELECTOR PLAN 1
Patient met SIRS criteria at time of admission. Currently leukocytosis and tachycardia noted. Blood cultures drawn, revealing NGTD at 12 hours. Patient currently hemodynamically stable, not requiring pressors or IV fluids. Will continue to monitor for s/s of clinical worsening. Currently on ceftriaxone 1000 mg q24 hours and acetaminophen PRN for fever  - Continue with q8hr vitals check, no s/s of severe sepsis, ICU consult not indicated at this time  - Currently on IV ceftriaxone, will consider transition to PO antibiotics, most likely ciprofloxacin or TMP/SMX depending on sensitivities   - Not currently requiring IV fluids, tolerating a regular diet

## 2019-12-03 NOTE — PROGRESS NOTE ADULT - SUBJECTIVE AND OBJECTIVE BOX
OVERNIGHT EVENTS: No acute events reported overnight    SUBJECTIVE / INTERVAL HPI: Patient seen and examined at bedside. Found resting comfortably and in no acute distress. Patient reports increased urination and malodorous urination prior to admission, prompting visit to the ED. Following admission, patient reports continued right flank pain. Denies fever symptoms. Denies blood or abnormal urinary discharge. Denies altered mental status, chest pain, palpitations or shortness of breath. Denies nausea, vomiting, constipation or diarrhea.     VITAL SIGNS:  Vital Signs Last 24 Hrs  T(C): 37.4 (03 Dec 2019 05:23), Max: 39.4 (02 Dec 2019 18:15)  T(F): 99.4 (03 Dec 2019 05:23), Max: 103 (02 Dec 2019 18:15)  HR: 100 (03 Dec 2019 05:23) (98 - 124)  BP: 102/61 (03 Dec 2019 05:23) (102/61 - 120/80)  RR: 17 (03 Dec 2019 05:23) (16 - 18)  SpO2: 96% (03 Dec 2019 05:23) (96% - 100%)    PHYSICAL EXAM:  General: WDWN, NAD  HEENT: NC/AT; PERRL, anicteric sclera; MMM, EOMI  Neck: supple, no JVD  Cardiovascular: +S1/S2; RRR, no M/G/R  Respiratory: CTA B/L; no W/R/R  Gastrointestinal: soft, no guarding, no rigidity, no rebound tenderness, mild pain to palpation of right quadrants which radiates to right flank  Back: Spine is midline and nontender, right CVA tenderness present, no left CVA tenderness  Extremities: WWP; no edema, clubbing or cyanosis, skin is warm and nondiaphoretic   Vascular: 2+ radial pulses  Neurological: AAOx3, CN 2-12 grossly intact    MEDICATIONS:  MEDICATIONS  (STANDING):  cefTRIAXone   IVPB 1000 milliGRAM(s) IV Intermittent every 24 hours  enoxaparin Injectable 40 milliGRAM(s) SubCutaneous every 24 hours  influenza   Vaccine 0.5 milliLiter(s) IntraMuscular once  potassium chloride    Tablet ER 20 milliEquivalent(s) Oral once  sodium chloride 0.9%. 1000 milliLiter(s) (75 mL/Hr) IV Continuous <Continuous>    MEDICATIONS  (PRN):  acetaminophen   Tablet .. 650 milliGRAM(s) Oral every 6 hours PRN Temp greater or equal to 38C (100.4F)    ALLERGIES: NKDA    LABS:                      12.4   16.01 )-----------( 259      ( 03 Dec 2019 06:54 )             38.7     139  |  105  |  8   ----------------------------<  99  3.6   |  24  |  0.92    Ca    8.0<L>      03 Dec 2019 06:54  Mg     1.8         TPro  8.8<H>  /  Alb  4.6  /  TBili  0.6  /  DBili  x   /  AST  19  /  ALT  15  /  AlkPhos  84  12-  PT/INR - ( 02 Dec 2019 10:47 )   PT: 12.8 sec;   INR: 1.13     PTT - ( 02 Dec 2019 10:47 )  PTT:31.4 sec    Urinalysis Basic - ( 02 Dec 2019 10:47 )  Color: Yellow / Appearance: Cloudy / S.025 / pH: x  Gluc: x / Ketone: 15 mg/dL  / Bili: Negative / Urobili: 0.2 E.U./dL   Blood: x / Protein: 100 mg/dL / Nitrite: POSITIVE   Leuk Esterase: Moderate / RBC: 5-10 /HPF / WBC Many /HPF   Sq Epi: x / Non Sq Epi: 5-10 /HPF / Bacteria: Many /HPF    RADIOLOGY & ADDITIONAL TESTS: CT abdomen reviewed

## 2019-12-03 NOTE — DISCHARGE NOTE PROVIDER - HOSPITAL COURSE
28F with past medical history of clamydia    Presented with abdominal pain w. radiation to the right flank, found to have UTI    Problem List/Main Diagnoses (system-based):     1. Urinary tract infection - Pt presented with 3 days of abdominal pain with radiation to the right flank associated with foul smelling urine and increased urinary frequency. On admission UA was positive. U/S retroperitoneum and CT abdomen performed revealing right sided hydronephrosis. Pt admitted and started on ceftriaxone, then transitioned to xxxxxxxxx PO, continued for full course of 7-14 days an outpatient.        New medications:     Labs to be followed outpatient: None    Exam to be followed outpatient: None 28F with past medical history of clamydia    Presented with abdominal pain w. radiation to the right flank, found to have UTI    Problem List/Main Diagnoses (system-based):     1. Urinary tract infection - Pt presented with 3 days of abdominal pain with radiation to the right flank associated with foul smelling urine and increased urinary frequency. On admission UA was positive. U/S retroperitoneum and CT abdomen performed revealing right sided hydronephrosis. Pt admitted and started on ceftriaxone, then transitioned to bactrim PO, continued for full course of 7-14 days an outpatient.        New medications: TMP-SMX 160mg/800mg x 14 days    Labs to be followed outpatient: None    Exam to be followed outpatient: None 28F with past medical history of clamydia    Presented with abdominal pain w. radiation to the right flank, found to have UTI    Problem List/Main Diagnoses (system-based):         1. Urinary tract infection - Pt presented with 3 days of abdominal pain with radiation to the right flank associated with foul smelling urine and increased urinary frequency. On admission UA was positive. U/S retroperitoneum and CT abdomen performed revealing right sided hydronephrosis. Pt admitted and started on ceftriaxone, then transitioned to bactrim PO, continued for full course of 7-14 days an outpatient.        New medications: TMP-SMX 160mg/800mg x 14 days    Labs to be followed outpatient: None    Exam to be followed outpatient: None

## 2019-12-03 NOTE — DISCHARGE NOTE PROVIDER - CARE PROVIDER_API CALL
EXAM note    Chief Complaint   Patient presents with   • Cough     is not improving, productive yellow, wheezing,  using spiriva, did take prednisone 20 mg daily for 5 days.  Shortness of breath   • UTI     is currently on  macrobid   • Psychiatric Problem     restarted prozac, is still  having some depression     History   Karen Jacinto is a pleasant 89 year old female who presents for follow up on chronic medical problems.   1. UTI. Improving on Macrobid.  2. Depression. She resumed Prozac that she had at home. Denies suicidal ideation.  3. COPD exacerbation. she continues to have cough, productive of yellow sputum.  I have reviewed the past medical, family and social history sections including the medications and allergies listed in the above medical record as well as the nursing notes.     Past Medical History:   Diagnosis Date   • Asthma    • Breast Cancer    • Disorder of bone and cartilage, unspecified 11/12/2010   • Hyperlipidemia    • Hypertension    • Inflammatory bowel disease    • Stroke (CMS/[facility])        Current Outpatient Prescriptions   Medication Sig Dispense Refill   • nitrofurantoin, macrocrystal-monohydrate, (MACROBID) 100 MG capsule Take 1 capsule by mouth 2 times daily. 20 capsule 0   • FLUoxetine (PROZAC) 20 MG capsule Take 20 mg by mouth daily.     • dextromethorphan-guaiFENesin (ROBITUSSIN DM)  MG/5ML syrup 1-2 tsp 4 x daily as needed 200 mL 0   • atorvastatin (LIPITOR) 10 MG tablet Take 1 tablet by mouth daily. Please come fasting to your July appointment for labs. 30 tablet 0   • Polyethyl Glycol-Propyl Glycol (SYSTANE ULTRA OP) Apply to eye as needed.     • mirabegron ER (MYRBETRIQ) 50 MG 24 hr tablet Take 1 tablet by mouth daily. 30 tablet 3   • baclofen (LIORESAL) 10 MG tablet Take 1 tablet by mouth 3 times daily. 90 tablet 6   • traMADol (ULTRAM) 50 MG tablet 1-2 tabs by mouth every 6 hours as needed for pain 20 tablet 0   • LORazepam (ATIVAN) 0.5 MG tablet Take 1 tablet by mouth  nightly. 30 tablet 2   • clopidogrel (PLAVIX) 75 MG tablet Take 1 tablet by mouth daily. 90 tablet 1   • dilTIAZem (CARDIZEM CD) 240 MG 24 hr capsule Take 1 capsule by mouth daily. 90 capsule 1   • docusate sodium (COLACE) 100 MG capsule Take 1 capsule by mouth 3 times daily as needed for Constipation. 90 capsule 2   • albuterol 108 (90 Base) MCG/ACT inhaler Inhale 2 puffs into the lungs 4 times daily as needed for Shortness of Breath or Wheezing. 1 Inhaler 0   • acetaminophen (TYLENOL) 325 MG tablet Take 2 tablets by mouth every 4 hours as needed for Pain. 30 tablet 1   • CARBOXYMethylcellulose (REFRESH PLUS) 0.5 % ophthalmic solution Place 1 drop into both eyes 4 times daily as needed for Dry Eyes. 1 Bottle 3   • Coenzyme Q10 (COQ10 PO) Take 1 tablet by mouth daily.     • Cholecalciferol (VITAMIN D3) 2000 UNITS capsule Take 2,000 Units by mouth daily.     • umeclidinium (INCRUSE ELLIPTA) 62.5 MCG/INH inhaler Inhale 1 puff into the lungs daily. 30 each 2   • meclizine (ANTIVERT) 12.5 MG tablet Take 1 tablet by mouth 3 times daily as needed for Dizziness. 30 tablet 1     No current facility-administered medications for this visit.        Review of systems    All other systems are reviewed and are negative except as documented in the HPI (history of present illness).    Physical Exam    Vital Signs:    Vitals:    06/20/18 1437   BP: 130/70   Pulse: 58   Temp: 98 °F (36.7 °C)   TempSrc: Tympanic   SpO2: 97%   Weight: 69 kg   Height: 5' 6\" (1.676 m)     Constitutional:  WD/WN (Well developed/well nourished).  In NAD (no acute distress). Appears stated age. Cooperative throughout exam.  HENT:  Normocephalic.  Atraumatic.  Bilateral external ears normal.   Neck:  No masses.  No tenderness.  Supple.  No stridor.  No JVD (jugular venous distension).  No bruits.  No thyromegaly.  Respiratory:  Normal breath sounds.  No respiratory distress.  No wheezes.  No crackles.  No rhonchi.  Cardiovascular:  Normal heart rate.  Normal  rhythm.  No murmurs.  No rubs.  No gallops.   Gastrointestinal:  Bowel sounds positive.  Nontender.  No hepatosplenomegaly.  Extremities:  Negative edema.  Strength 5 out of 5 of all extremities.  Neurologic:  Alert, oriented x3.     ASSESSMENT AND PLAN  (J43.9) Pulmonary emphysema, unspecified emphysema type (CMS/HCC)  (primary encounter diagnosis)  Comment: Therapeutic lifestyle modifications were discussed with patient in detail and patient verbalized understanding.  All questions were answered.  Plan: sertraline (ZOLOFT) 50 MG tablet, benzonatate         (TESSALON PERLES) 200 MG capsule,         Dextromethorphan-Guaifenesin  MG/10ML         Solution          (N39.0) Urinary tract infection without hematuria, site unspecified  Comment: discussed  Plan: continue Macrobid    (J44.1,  J45.901) Asthma with COPD with exacerbation (CMS/HCC)  Comment: discussed. Therapeutic lifestyle modifications were discussed with patient in detail and patient verbalized understanding.  All questions were answered.  Plan: predniSONE (DELTASONE) 20 MG tablet,         Dextromethorphan-Guaifenesin  MG/10ML         Solution        continue incruze inhaler       Maximiliano Kirk)  Internal Medicine  [address] [address]  Phone: [phone]  Fax: [phone]  Follow Up Time: 1 month

## 2019-12-03 NOTE — PROGRESS NOTE ADULT - SUBJECTIVE AND OBJECTIVE BOX
Still feeling lousy in AM   Still with pain requiring meds tho less often.  Temp 100.6  Abdomen soft  WBC down to 67557  Urine CS +E Coli sensitivities pending.  Blood CS negative so far

## 2019-12-03 NOTE — PROGRESS NOTE ADULT - PROBLEM SELECTOR PLAN 4
Patient treated for chlamydia earlier in 2019. Pt treated at that time with no s/s of recurring infection. No indication of PID on physical exam.  - No indication of repeat treatment

## 2019-12-03 NOTE — PROGRESS NOTE ADULT - PROBLEM SELECTOR PLAN 2
Pt presents with urinary symptoms, associated with costovertebral angle tenderness with pain radiating down right flank. Positive UA noted. CT reveals mild hydronephrosis without evidence of stone. Patient given morphine in ED.  - Pt reports pain is currently controlled with tylenol  - Antibiotic management as above

## 2019-12-04 LAB
ANION GAP SERPL CALC-SCNC: 8 MMOL/L — SIGNIFICANT CHANGE UP (ref 5–17)
BUN SERPL-MCNC: 4 MG/DL — LOW (ref 7–23)
CALCIUM SERPL-MCNC: 8.7 MG/DL — SIGNIFICANT CHANGE UP (ref 8.4–10.5)
CHLORIDE SERPL-SCNC: 105 MMOL/L — SIGNIFICANT CHANGE UP (ref 96–108)
CO2 SERPL-SCNC: 26 MMOL/L — SIGNIFICANT CHANGE UP (ref 22–31)
CREAT SERPL-MCNC: 0.84 MG/DL — SIGNIFICANT CHANGE UP (ref 0.5–1.3)
GLUCOSE SERPL-MCNC: 97 MG/DL — SIGNIFICANT CHANGE UP (ref 70–99)
HCT VFR BLD CALC: 39.1 % — SIGNIFICANT CHANGE UP (ref 34.5–45)
HGB BLD-MCNC: 12.6 G/DL — SIGNIFICANT CHANGE UP (ref 11.5–15.5)
MAGNESIUM SERPL-MCNC: 1.9 MG/DL — SIGNIFICANT CHANGE UP (ref 1.6–2.6)
MCHC RBC-ENTMCNC: 27.5 PG — SIGNIFICANT CHANGE UP (ref 27–34)
MCHC RBC-ENTMCNC: 32.2 GM/DL — SIGNIFICANT CHANGE UP (ref 32–36)
MCV RBC AUTO: 85.4 FL — SIGNIFICANT CHANGE UP (ref 80–100)
NRBC # BLD: 0 /100 WBCS — SIGNIFICANT CHANGE UP (ref 0–0)
PLATELET # BLD AUTO: 289 K/UL — SIGNIFICANT CHANGE UP (ref 150–400)
POTASSIUM SERPL-MCNC: 3.5 MMOL/L — SIGNIFICANT CHANGE UP (ref 3.5–5.3)
POTASSIUM SERPL-SCNC: 3.5 MMOL/L — SIGNIFICANT CHANGE UP (ref 3.5–5.3)
RBC # BLD: 4.58 M/UL — SIGNIFICANT CHANGE UP (ref 3.8–5.2)
RBC # FLD: 12.8 % — SIGNIFICANT CHANGE UP (ref 10.3–14.5)
SODIUM SERPL-SCNC: 139 MMOL/L — SIGNIFICANT CHANGE UP (ref 135–145)
WBC # BLD: 8.67 K/UL — SIGNIFICANT CHANGE UP (ref 3.8–10.5)
WBC # FLD AUTO: 8.67 K/UL — SIGNIFICANT CHANGE UP (ref 3.8–10.5)

## 2019-12-04 PROCEDURE — 76770 US EXAM ABDO BACK WALL COMP: CPT | Mod: 26

## 2019-12-04 PROCEDURE — 76775 US EXAM ABDO BACK WALL LIM: CPT | Mod: 26

## 2019-12-04 RX ORDER — POTASSIUM CHLORIDE 20 MEQ
20 PACKET (EA) ORAL ONCE
Refills: 0 | Status: COMPLETED | OUTPATIENT
Start: 2019-12-04 | End: 2019-12-04

## 2019-12-04 RX ORDER — BUPROPION HYDROCHLORIDE 150 MG/1
150 TABLET, EXTENDED RELEASE ORAL DAILY
Refills: 0 | Status: DISCONTINUED | OUTPATIENT
Start: 2019-12-04 | End: 2019-12-05

## 2019-12-04 RX ORDER — VENLAFAXINE HCL 75 MG
150 CAPSULE, EXT RELEASE 24 HR ORAL DAILY
Refills: 0 | Status: DISCONTINUED | OUTPATIENT
Start: 2019-12-04 | End: 2019-12-05

## 2019-12-04 RX ORDER — CEFTRIAXONE 500 MG/1
2000 INJECTION, POWDER, FOR SOLUTION INTRAMUSCULAR; INTRAVENOUS EVERY 24 HOURS
Refills: 0 | Status: DISCONTINUED | OUTPATIENT
Start: 2019-12-04 | End: 2019-12-05

## 2019-12-04 RX ORDER — LACTULOSE 10 G/15ML
10 SOLUTION ORAL ONCE
Refills: 0 | Status: COMPLETED | OUTPATIENT
Start: 2019-12-04 | End: 2019-12-04

## 2019-12-04 RX ORDER — SENNA PLUS 8.6 MG/1
1 TABLET ORAL DAILY
Refills: 0 | Status: DISCONTINUED | OUTPATIENT
Start: 2019-12-04 | End: 2019-12-05

## 2019-12-04 RX ORDER — OXYCODONE AND ACETAMINOPHEN 5; 325 MG/1; MG/1
1 TABLET ORAL ONCE
Refills: 0 | Status: DISCONTINUED | OUTPATIENT
Start: 2019-12-04 | End: 2019-12-04

## 2019-12-04 RX ADMIN — BUPROPION HYDROCHLORIDE 150 MILLIGRAM(S): 150 TABLET, EXTENDED RELEASE ORAL at 11:13

## 2019-12-04 RX ADMIN — CEFTRIAXONE 100 MILLIGRAM(S): 500 INJECTION, POWDER, FOR SOLUTION INTRAMUSCULAR; INTRAVENOUS at 12:06

## 2019-12-04 RX ADMIN — LACTULOSE 10 GRAM(S): 10 SOLUTION ORAL at 19:13

## 2019-12-04 RX ADMIN — OXYCODONE AND ACETAMINOPHEN 1 TABLET(S): 5; 325 TABLET ORAL at 06:53

## 2019-12-04 RX ADMIN — Medication 150 MILLIGRAM(S): at 11:13

## 2019-12-04 RX ADMIN — Medication 20 MILLIEQUIVALENT(S): at 08:02

## 2019-12-04 RX ADMIN — POLYETHYLENE GLYCOL 3350 17 GRAM(S): 17 POWDER, FOR SOLUTION ORAL at 11:13

## 2019-12-04 RX ADMIN — OXYCODONE AND ACETAMINOPHEN 1 TABLET(S): 5; 325 TABLET ORAL at 07:21

## 2019-12-04 NOTE — PROGRESS NOTE ADULT - PROBLEM SELECTOR PLAN 3
Pain requiring morphine in ED, currently controlled with Tylenol and percoset  - Treatment as above  - May consider morphine if intractable pain returns, patient reports pain currently well controlled

## 2019-12-04 NOTE — PROGRESS NOTE ADULT - ASSESSMENT
28F admitted with increased urination with urinary discharge, found to have positive Rt. CVAT positive UA and mild right hydronephrosis, admitted to medicine for IV antibiotics, plan to transition to PO following urine culture sensitivities

## 2019-12-04 NOTE — PROGRESS NOTE ADULT - SUBJECTIVE AND OBJECTIVE BOX
OVERNIGHT EVENTS: No acute events     SUBJECTIVE / INTERVAL HPI: Patient seen and examined at bedside, resting comfortably in bed in no acute distress. Denies gross hematuria or dysuria at time of exam. Reports continued right flank pain, improved from yesterday. Otherwise no acute complaints. Denies CP, SOB or syncope. Denies N/V, diarrhea. Denies orthopnea.    VITAL SIGNS:  Vital Signs Last 24 Hrs  T(C): 36.8 (04 Dec 2019 05:43), Max: 38.1 (03 Dec 2019 09:29)  T(F): 98.3 (04 Dec 2019 05:43), Max: 100.6 (03 Dec 2019 09:29)  HR: 101 (04 Dec 2019 05:43) (101 - 115)  BP: 102/68 (04 Dec 2019 05:43) (102/68 - 115/77)  RR: 16 (04 Dec 2019 05:43) (16 - 18)  SpO2: 99% (04 Dec 2019 05:43) (97% - 100%)    PHYSICAL EXAM:  General: WDWN, NAD  HEENT: NC/AT; PERRL, MMM, EOMI  Neck: supple, no JVD ntoed  Cardiovascular: +S1/S2; RRR, no M/G/R  Respiratory: CTA B/L; no W/R/R  Gastrointestinal: soft, NT/ND, mild pain to palpation of right quadrants, no guarding, no rebound tenderness  Back: Spine midline and nontender to palpation, (+)CVAT on right, none on left  Extremities: WWP; no edema, clubbing or cyanosis  Vascular: 2+ radial pulses  Neurological: AAOx3, CN2-12 assessed and grossly intact    MEDICATIONS:  MEDICATIONS  (STANDING):  cefTRIAXone   IVPB 1000 milliGRAM(s) IV Intermittent every 24 hours  influenza   Vaccine 0.5 milliLiter(s) IntraMuscular once  polyethylene glycol 3350 17 Gram(s) Oral daily  sodium chloride 0.9%. 1000 milliLiter(s) (75 mL/Hr) IV Continuous <Continuous>    MEDICATIONS  (PRN):  acetaminophen   Tablet .. 650 milliGRAM(s) Oral every 6 hours PRN Temp greater or equal to 38C (100.4F)  acetaminophen   Tablet .. 650 milliGRAM(s) Oral every 6 hours PRN Mild Pain (1 - 3)  ketorolac 10 milliGRAM(s) Oral every 6 hours PRN Moderate Pain (4 - 6)  senna 1 Tablet(s) Oral daily PRN Constipation    ALLERGIES: NKDA    LABS:                      12.6   8.67  )-----------( 289      ( 04 Dec 2019 06:44 )             39.1     139  |  105  |  4<L>  ----------------------------<  97  3.5   |  26  |  0.84    Ca    8.7      04 Dec 2019 06:44  Mg     1.9     12-    TPro  8.8<H>  /  Alb  4.6  /  TBili  0.6  /  DBili  x   /  AST  19  /  ALT  15  /  AlkPhos  84  12-02  PT/INR - ( 02 Dec 2019 10:47 )   PT: 12.8 sec;   INR: 1.13     PTT - ( 02 Dec 2019 10:47 )  PTT:31.4 sec  Urinalysis Basic - ( 02 Dec 2019 10:47 )    Color: Yellow / Appearance: Cloudy / S.025 / pH: x  Gluc: x / Ketone: 15 mg/dL  / Bili: Negative / Urobili: 0.2 E.U./dL   Blood: x / Protein: 100 mg/dL / Nitrite: POSITIVE   Leuk Esterase: Moderate / RBC: 5-10 /HPF / WBC Many /HPF   Sq Epi: x / Non Sq Epi: 5-10 /HPF / Bacteria: Many /HPF    RADIOLOGY & ADDITIONAL TESTS: Reviewed.

## 2019-12-04 NOTE — PROGRESS NOTE ADULT - ATTENDING COMMENTS
Pyelonephritis with Urosepsis resolving  Await sensitivities to allow transition to oral antibiotics and monitor for one day on orals  Would also repeat sono to be sure hydronephrosis has resolved prior to DC

## 2019-12-04 NOTE — PROGRESS NOTE ADULT - PROBLEM SELECTOR PLAN 2
Pt presents with urinary symptoms, associated with costovertebral angle tenderness with pain radiating down right flank. Positive UA noted. CT reveals mild hydronephrosis without evidence of stone. Patient given morphine in ED.  - Patient requiring percoset 5mg/325mg   - Antibiotic management as above

## 2019-12-04 NOTE — PROGRESS NOTE ADULT - SUBJECTIVE AND OBJECTIVE BOX
Patient feeling better this AM.   Slept well.  Ate.   Afebrile  HR down to 80s Abdomen soft  WBC down to 8000.  E Coli sensitivities still pending

## 2019-12-04 NOTE — PROGRESS NOTE ADULT - PROBLEM SELECTOR PLAN 1
Patient met SIRS criteria at time of admission. Currently leukocytosis and tachycardia noted. Blood cultures drawn, revealing NGTD. Patient currently hemodynamically stable, not requiring pressors or IV fluids. Will continue to monitor for s/s of clinical worsening. Currently on ceftriaxone 1000 mg q24 hours and acetaminophen PRN for fever  - Continue with q8hr vitals check, no s/s of severe sepsis, ICU consult not indicated at this time  - Currently on IV ceftriaxone, will consider transition to PO antibiotics, most likely ciprofloxacin or TMP/SMX depending on sensitivities  - Not currently requiring IV fluids, tolerating a regular diet

## 2019-12-05 ENCOUNTER — TRANSCRIPTION ENCOUNTER (OUTPATIENT)
Age: 29
End: 2019-12-05

## 2019-12-05 VITALS — WEIGHT: 129.19 LBS

## 2019-12-05 LAB
ANION GAP SERPL CALC-SCNC: 9 MMOL/L — SIGNIFICANT CHANGE UP (ref 5–17)
BUN SERPL-MCNC: 6 MG/DL — LOW (ref 7–23)
CALCIUM SERPL-MCNC: 9.2 MG/DL — SIGNIFICANT CHANGE UP (ref 8.4–10.5)
CHLORIDE SERPL-SCNC: 102 MMOL/L — SIGNIFICANT CHANGE UP (ref 96–108)
CO2 SERPL-SCNC: 27 MMOL/L — SIGNIFICANT CHANGE UP (ref 22–31)
CREAT SERPL-MCNC: 0.82 MG/DL — SIGNIFICANT CHANGE UP (ref 0.5–1.3)
GLUCOSE SERPL-MCNC: 86 MG/DL — SIGNIFICANT CHANGE UP (ref 70–99)
HCT VFR BLD CALC: 36.9 % — SIGNIFICANT CHANGE UP (ref 34.5–45)
HGB BLD-MCNC: 12.3 G/DL — SIGNIFICANT CHANGE UP (ref 11.5–15.5)
MAGNESIUM SERPL-MCNC: 1.9 MG/DL — SIGNIFICANT CHANGE UP (ref 1.6–2.6)
MCHC RBC-ENTMCNC: 28.1 PG — SIGNIFICANT CHANGE UP (ref 27–34)
MCHC RBC-ENTMCNC: 33.3 GM/DL — SIGNIFICANT CHANGE UP (ref 32–36)
MCV RBC AUTO: 84.2 FL — SIGNIFICANT CHANGE UP (ref 80–100)
NRBC # BLD: 0 /100 WBCS — SIGNIFICANT CHANGE UP (ref 0–0)
PLATELET # BLD AUTO: 312 K/UL — SIGNIFICANT CHANGE UP (ref 150–400)
POTASSIUM SERPL-MCNC: 3.7 MMOL/L — SIGNIFICANT CHANGE UP (ref 3.5–5.3)
POTASSIUM SERPL-SCNC: 3.7 MMOL/L — SIGNIFICANT CHANGE UP (ref 3.5–5.3)
RBC # BLD: 4.38 M/UL — SIGNIFICANT CHANGE UP (ref 3.8–5.2)
RBC # FLD: 13.1 % — SIGNIFICANT CHANGE UP (ref 10.3–14.5)
SODIUM SERPL-SCNC: 138 MMOL/L — SIGNIFICANT CHANGE UP (ref 135–145)
WBC # BLD: 6.79 K/UL — SIGNIFICANT CHANGE UP (ref 3.8–10.5)
WBC # FLD AUTO: 6.79 K/UL — SIGNIFICANT CHANGE UP (ref 3.8–10.5)

## 2019-12-05 PROCEDURE — 84702 CHORIONIC GONADOTROPIN TEST: CPT

## 2019-12-05 PROCEDURE — 76705 ECHO EXAM OF ABDOMEN: CPT

## 2019-12-05 PROCEDURE — 99285 EMERGENCY DEPT VISIT HI MDM: CPT | Mod: 25

## 2019-12-05 PROCEDURE — 81001 URINALYSIS AUTO W/SCOPE: CPT

## 2019-12-05 PROCEDURE — 85730 THROMBOPLASTIN TIME PARTIAL: CPT

## 2019-12-05 PROCEDURE — 83605 ASSAY OF LACTIC ACID: CPT

## 2019-12-05 PROCEDURE — 85610 PROTHROMBIN TIME: CPT

## 2019-12-05 PROCEDURE — 36415 COLL VENOUS BLD VENIPUNCTURE: CPT

## 2019-12-05 PROCEDURE — 96375 TX/PRO/DX INJ NEW DRUG ADDON: CPT

## 2019-12-05 PROCEDURE — 85027 COMPLETE CBC AUTOMATED: CPT

## 2019-12-05 PROCEDURE — 87040 BLOOD CULTURE FOR BACTERIA: CPT

## 2019-12-05 PROCEDURE — 87086 URINE CULTURE/COLONY COUNT: CPT

## 2019-12-05 PROCEDURE — 76770 US EXAM ABDO BACK WALL COMP: CPT

## 2019-12-05 PROCEDURE — 93005 ELECTROCARDIOGRAM TRACING: CPT

## 2019-12-05 PROCEDURE — 87186 SC STD MICRODIL/AGAR DIL: CPT

## 2019-12-05 PROCEDURE — 96376 TX/PRO/DX INJ SAME DRUG ADON: CPT

## 2019-12-05 PROCEDURE — 80053 COMPREHEN METABOLIC PANEL: CPT

## 2019-12-05 PROCEDURE — 80048 BASIC METABOLIC PNL TOTAL CA: CPT

## 2019-12-05 PROCEDURE — 74176 CT ABD & PELVIS W/O CONTRAST: CPT

## 2019-12-05 PROCEDURE — 85025 COMPLETE CBC W/AUTO DIFF WBC: CPT

## 2019-12-05 PROCEDURE — 76775 US EXAM ABDO BACK WALL LIM: CPT

## 2019-12-05 PROCEDURE — 96361 HYDRATE IV INFUSION ADD-ON: CPT

## 2019-12-05 PROCEDURE — 83735 ASSAY OF MAGNESIUM: CPT

## 2019-12-05 PROCEDURE — 96365 THER/PROPH/DIAG IV INF INIT: CPT

## 2019-12-05 PROCEDURE — 87150 DNA/RNA AMPLIFIED PROBE: CPT

## 2019-12-05 RX ORDER — CIPROFLOXACIN LACTATE 400MG/40ML
1 VIAL (ML) INTRAVENOUS
Qty: 28 | Refills: 0
Start: 2019-12-05 | End: 2019-12-18

## 2019-12-05 RX ORDER — AZTREONAM 2 G
1 VIAL (EA) INJECTION
Qty: 14 | Refills: 0
Start: 2019-12-05 | End: 2019-12-18

## 2019-12-05 RX ADMIN — Medication 10 MILLIGRAM(S): at 11:02

## 2019-12-05 RX ADMIN — BUPROPION HYDROCHLORIDE 150 MILLIGRAM(S): 150 TABLET, EXTENDED RELEASE ORAL at 11:02

## 2019-12-05 RX ADMIN — Medication 150 MILLIGRAM(S): at 11:02

## 2019-12-05 RX ADMIN — CEFTRIAXONE 100 MILLIGRAM(S): 500 INJECTION, POWDER, FOR SOLUTION INTRAMUSCULAR; INTRAVENOUS at 11:01

## 2019-12-05 RX ADMIN — SENNA PLUS 1 TABLET(S): 8.6 TABLET ORAL at 11:02

## 2019-12-05 RX ADMIN — POLYETHYLENE GLYCOL 3350 17 GRAM(S): 17 POWDER, FOR SOLUTION ORAL at 11:08

## 2019-12-05 NOTE — DIETITIAN INITIAL EVALUATION ADULT. - ENERGY NEEDS
Ht: 5'4", Wt: 58.6kg, IBW: 54.5kg, 107.5%IBW.   Needs calculated based on [facility] standards of care.

## 2019-12-05 NOTE — DIETITIAN INITIAL EVALUATION ADULT. - ADD RECOMMEND
Provided constipation nutrition education. Encouraged adequate PO intake, discussed diet order, discussed meal ordering system.

## 2019-12-05 NOTE — DISCHARGE NOTE NURSING/CASE MANAGEMENT/SOCIAL WORK - PATIENT PORTAL LINK FT
You can access the FollowMyHealth Patient Portal offered by [facility] by registering at the following website: [email or web address]. By joining [employer]s FollowMyHealth portal, you will also be able to view your health information using other applications (apps) compatible with our system.

## 2019-12-05 NOTE — DIETITIAN INITIAL EVALUATION ADULT. - OTHER INFO
Pt seen for initial assessment. 28F admitted with increased urination with urinary discharge, found to have positive Rt. CVAT positive UA and mild right hydronephrosis, admitted to medicine for IV antibiotics, plan to transition to PO following urine culture sensitivities. Skin: no edema, intact. Pt seen resting in chair, awake, alert. Pt is on regular diet with good PO intake. Reports good appetite PTA, no dietary restrictions. NKFA. Denies N/V/D, reports constipation, last BM 12/1 (ordered for senna, miralax). Denies difficulty chewing/swallowing. Not reporting pain at this time. RD to follow up per protocol.

## 2019-12-09 DIAGNOSIS — F41.9 ANXIETY DISORDER, UNSPECIFIED: ICD-10-CM

## 2019-12-09 DIAGNOSIS — A41.9 SEPSIS, UNSPECIFIED ORGANISM: ICD-10-CM

## 2019-12-09 DIAGNOSIS — N13.6 PYONEPHROSIS: ICD-10-CM

## 2019-12-09 DIAGNOSIS — Z86.19 PERSONAL HISTORY OF OTHER INFECTIOUS AND PARASITIC DISEASES: ICD-10-CM

## 2019-12-09 DIAGNOSIS — F32.9 MAJOR DEPRESSIVE DISORDER, SINGLE EPISODE, UNSPECIFIED: ICD-10-CM

## 2019-12-09 DIAGNOSIS — B96.20 UNSPECIFIED ESCHERICHIA COLI [E. COLI] AS THE CAUSE OF DISEASES CLASSIFIED ELSEWHERE: ICD-10-CM

## 2019-12-09 DIAGNOSIS — N11.8 OTHER CHRONIC TUBULO-INTERSTITIAL NEPHRITIS: ICD-10-CM

## 2019-12-09 LAB
CULTURE RESULTS: SIGNIFICANT CHANGE UP
SPECIMEN SOURCE: SIGNIFICANT CHANGE UP

## 2021-03-08 ENCOUNTER — TRANSCRIPTION ENCOUNTER (OUTPATIENT)
Age: 31
End: 2021-03-08

## 2021-04-03 ENCOUNTER — TRANSCRIPTION ENCOUNTER (OUTPATIENT)
Age: 31
End: 2021-04-03

## 2021-07-20 NOTE — PATIENT PROFILE ADULT - INFORMATION PROVIDED TO:
Orders completed and results will be sent to Dr. Root for her upcoming appointment.   Kristen Kehr PA-C     patient

## 2021-11-05 ENCOUNTER — TRANSCRIPTION ENCOUNTER (OUTPATIENT)
Age: 31
End: 2021-11-05

## 2021-12-14 ENCOUNTER — EMERGENCY (EMERGENCY)
Facility: HOSPITAL | Age: 31
LOS: 1 days | Discharge: HOME | End: 2021-12-14
Admitting: EMERGENCY MEDICINE
Payer: COMMERCIAL

## 2021-12-14 VITALS
OXYGEN SATURATION: 97 % | HEART RATE: 103 BPM | DIASTOLIC BLOOD PRESSURE: 90 MMHG | TEMPERATURE: 98 F | RESPIRATION RATE: 16 BRPM | HEIGHT: 64 IN | SYSTOLIC BLOOD PRESSURE: 132 MMHG | WEIGHT: 139.99 LBS

## 2021-12-14 DIAGNOSIS — Z20.822 CONTACT WITH AND (SUSPECTED) EXPOSURE TO COVID-19: ICD-10-CM

## 2021-12-14 DIAGNOSIS — M54.9 DORSALGIA, UNSPECIFIED: ICD-10-CM

## 2021-12-14 DIAGNOSIS — R10.9 UNSPECIFIED ABDOMINAL PAIN: ICD-10-CM

## 2021-12-14 DIAGNOSIS — Z87.19 PERSONAL HISTORY OF OTHER DISEASES OF THE DIGESTIVE SYSTEM: ICD-10-CM

## 2021-12-14 DIAGNOSIS — Z87.440 PERSONAL HISTORY OF URINARY (TRACT) INFECTIONS: ICD-10-CM

## 2021-12-14 PROCEDURE — 99285 EMERGENCY DEPT VISIT HI MDM: CPT

## 2021-12-15 VITALS
HEART RATE: 78 BPM | RESPIRATION RATE: 18 BRPM | OXYGEN SATURATION: 98 % | TEMPERATURE: 98 F | DIASTOLIC BLOOD PRESSURE: 86 MMHG | SYSTOLIC BLOOD PRESSURE: 135 MMHG

## 2021-12-15 LAB
ALBUMIN SERPL ELPH-MCNC: 4.3 G/DL — SIGNIFICANT CHANGE UP (ref 3.3–5)
ALP SERPL-CCNC: 87 U/L — SIGNIFICANT CHANGE UP (ref 40–120)
ALT FLD-CCNC: 12 U/L — SIGNIFICANT CHANGE UP (ref 10–45)
ANION GAP SERPL CALC-SCNC: 8 MMOL/L — SIGNIFICANT CHANGE UP (ref 5–17)
APPEARANCE UR: CLEAR — SIGNIFICANT CHANGE UP
AST SERPL-CCNC: 18 U/L — SIGNIFICANT CHANGE UP (ref 10–40)
BACTERIA # UR AUTO: ABNORMAL /HPF
BASOPHILS # BLD AUTO: 0.06 K/UL — SIGNIFICANT CHANGE UP (ref 0–0.2)
BASOPHILS NFR BLD AUTO: 0.6 % — SIGNIFICANT CHANGE UP (ref 0–2)
BILIRUB SERPL-MCNC: 0.3 MG/DL — SIGNIFICANT CHANGE UP (ref 0.2–1.2)
BILIRUB UR-MCNC: NEGATIVE — SIGNIFICANT CHANGE UP
BUN SERPL-MCNC: 18 MG/DL — SIGNIFICANT CHANGE UP (ref 7–23)
CALCIUM SERPL-MCNC: 9.4 MG/DL — SIGNIFICANT CHANGE UP (ref 8.4–10.5)
CHLORIDE SERPL-SCNC: 103 MMOL/L — SIGNIFICANT CHANGE UP (ref 96–108)
CO2 SERPL-SCNC: 26 MMOL/L — SIGNIFICANT CHANGE UP (ref 22–31)
COLOR SPEC: YELLOW — SIGNIFICANT CHANGE UP
CREAT SERPL-MCNC: 0.89 MG/DL — SIGNIFICANT CHANGE UP (ref 0.5–1.3)
DIFF PNL FLD: ABNORMAL
EOSINOPHIL # BLD AUTO: 0.2 K/UL — SIGNIFICANT CHANGE UP (ref 0–0.5)
EOSINOPHIL NFR BLD AUTO: 1.9 % — SIGNIFICANT CHANGE UP (ref 0–6)
EPI CELLS # UR: ABNORMAL /HPF (ref 0–5)
GLUCOSE SERPL-MCNC: 83 MG/DL — SIGNIFICANT CHANGE UP (ref 70–99)
GLUCOSE UR QL: NEGATIVE — SIGNIFICANT CHANGE UP
HCG UR QL: NEGATIVE — SIGNIFICANT CHANGE UP
HCT VFR BLD CALC: 42.2 % — SIGNIFICANT CHANGE UP (ref 34.5–45)
HGB BLD-MCNC: 14.3 G/DL — SIGNIFICANT CHANGE UP (ref 11.5–15.5)
IMM GRANULOCYTES NFR BLD AUTO: 0.3 % — SIGNIFICANT CHANGE UP (ref 0–1.5)
KETONES UR-MCNC: ABNORMAL MG/DL
LEUKOCYTE ESTERASE UR-ACNC: NEGATIVE — SIGNIFICANT CHANGE UP
LIDOCAIN IGE QN: 26 U/L — SIGNIFICANT CHANGE UP (ref 7–60)
LYMPHOCYTES # BLD AUTO: 3.25 K/UL — SIGNIFICANT CHANGE UP (ref 1–3.3)
LYMPHOCYTES # BLD AUTO: 31.5 % — SIGNIFICANT CHANGE UP (ref 13–44)
MCHC RBC-ENTMCNC: 28 PG — SIGNIFICANT CHANGE UP (ref 27–34)
MCHC RBC-ENTMCNC: 33.9 GM/DL — SIGNIFICANT CHANGE UP (ref 32–36)
MCV RBC AUTO: 82.6 FL — SIGNIFICANT CHANGE UP (ref 80–100)
MONOCYTES # BLD AUTO: 0.74 K/UL — SIGNIFICANT CHANGE UP (ref 0–0.9)
MONOCYTES NFR BLD AUTO: 7.2 % — SIGNIFICANT CHANGE UP (ref 2–14)
NEUTROPHILS # BLD AUTO: 6.05 K/UL — SIGNIFICANT CHANGE UP (ref 1.8–7.4)
NEUTROPHILS NFR BLD AUTO: 58.5 % — SIGNIFICANT CHANGE UP (ref 43–77)
NITRITE UR-MCNC: NEGATIVE — SIGNIFICANT CHANGE UP
NRBC # BLD: 0 /100 WBCS — SIGNIFICANT CHANGE UP (ref 0–0)
PH UR: 7 — SIGNIFICANT CHANGE UP (ref 5–8)
PLATELET # BLD AUTO: 462 K/UL — HIGH (ref 150–400)
POTASSIUM SERPL-MCNC: 3.8 MMOL/L — SIGNIFICANT CHANGE UP (ref 3.5–5.3)
POTASSIUM SERPL-SCNC: 3.8 MMOL/L — SIGNIFICANT CHANGE UP (ref 3.5–5.3)
PROT SERPL-MCNC: 7.7 G/DL — SIGNIFICANT CHANGE UP (ref 6–8.3)
PROT UR-MCNC: 30 MG/DL
RBC # BLD: 5.11 M/UL — SIGNIFICANT CHANGE UP (ref 3.8–5.2)
RBC # FLD: 13.1 % — SIGNIFICANT CHANGE UP (ref 10.3–14.5)
RBC CASTS # UR COMP ASSIST: < 5 /HPF — SIGNIFICANT CHANGE UP
SARS-COV-2 RNA SPEC QL NAA+PROBE: NEGATIVE — SIGNIFICANT CHANGE UP
SODIUM SERPL-SCNC: 137 MMOL/L — SIGNIFICANT CHANGE UP (ref 135–145)
SP GR SPEC: 1.02 — SIGNIFICANT CHANGE UP (ref 1–1.03)
UROBILINOGEN FLD QL: 0.2 E.U./DL — SIGNIFICANT CHANGE UP
WBC # BLD: 10.33 K/UL — SIGNIFICANT CHANGE UP (ref 3.8–10.5)
WBC # FLD AUTO: 10.33 K/UL — SIGNIFICANT CHANGE UP (ref 3.8–10.5)
WBC UR QL: ABNORMAL /HPF

## 2021-12-15 PROCEDURE — 81025 URINE PREGNANCY TEST: CPT

## 2021-12-15 PROCEDURE — 36415 COLL VENOUS BLD VENIPUNCTURE: CPT

## 2021-12-15 PROCEDURE — 87635 SARS-COV-2 COVID-19 AMP PRB: CPT

## 2021-12-15 PROCEDURE — 83690 ASSAY OF LIPASE: CPT

## 2021-12-15 PROCEDURE — 74177 CT ABD & PELVIS W/CONTRAST: CPT | Mod: 26,MG

## 2021-12-15 PROCEDURE — G1004: CPT

## 2021-12-15 PROCEDURE — 85025 COMPLETE CBC W/AUTO DIFF WBC: CPT

## 2021-12-15 PROCEDURE — 80053 COMPREHEN METABOLIC PANEL: CPT

## 2021-12-15 PROCEDURE — 74177 CT ABD & PELVIS W/CONTRAST: CPT | Mod: MG

## 2021-12-15 PROCEDURE — 96376 TX/PRO/DX INJ SAME DRUG ADON: CPT

## 2021-12-15 PROCEDURE — 81001 URINALYSIS AUTO W/SCOPE: CPT

## 2021-12-15 PROCEDURE — 96374 THER/PROPH/DIAG INJ IV PUSH: CPT

## 2021-12-15 PROCEDURE — 99284 EMERGENCY DEPT VISIT MOD MDM: CPT | Mod: 25

## 2021-12-15 RX ORDER — IOHEXOL 300 MG/ML
30 INJECTION, SOLUTION INTRAVENOUS ONCE
Refills: 0 | Status: COMPLETED | OUTPATIENT
Start: 2021-12-15 | End: 2021-12-15

## 2021-12-15 RX ORDER — SODIUM CHLORIDE 9 MG/ML
1000 INJECTION INTRAMUSCULAR; INTRAVENOUS; SUBCUTANEOUS ONCE
Refills: 0 | Status: COMPLETED | OUTPATIENT
Start: 2021-12-15 | End: 2021-12-15

## 2021-12-15 RX ORDER — MORPHINE SULFATE 50 MG/1
2 CAPSULE, EXTENDED RELEASE ORAL ONCE
Refills: 0 | Status: DISCONTINUED | OUTPATIENT
Start: 2021-12-15 | End: 2021-12-15

## 2021-12-15 RX ADMIN — MORPHINE SULFATE 2 MILLIGRAM(S): 50 CAPSULE, EXTENDED RELEASE ORAL at 03:35

## 2021-12-15 RX ADMIN — SODIUM CHLORIDE 1000 MILLILITER(S): 9 INJECTION INTRAMUSCULAR; INTRAVENOUS; SUBCUTANEOUS at 01:55

## 2021-12-15 RX ADMIN — IOHEXOL 30 MILLILITER(S): 300 INJECTION, SOLUTION INTRAVENOUS at 01:55

## 2021-12-15 RX ADMIN — MORPHINE SULFATE 2 MILLIGRAM(S): 50 CAPSULE, EXTENDED RELEASE ORAL at 01:56

## 2021-12-15 NOTE — ED PROVIDER NOTE - PATIENT PORTAL LINK FT
You can access the FollowMyHealth Patient Portal offered by [facility] by registering at the following website: [email or web address]. By joining [employer]’s FollowMyHealth portal, you will also be able to view your health information using other applications (apps) compatible with our system.

## 2021-12-15 NOTE — ED PROVIDER NOTE - NSFOLLOWUPINSTRUCTIONS_ED_ALL_ED_FT
Please follow up with your primary care physician tomorrow.  Stay well-hydrated.    Return to the Emergency Department if you have any new or worsening symptoms, or if you have any concerns.  ===================    Flank Pain    WHAT YOU NEED TO KNOW:    Flank pain is felt in the area below your ribcage and above your hip bones, often in the lower back. Your pain may be dull or so severe that you cannot get comfortable. The pain may stay in one area or radiate to another area. It may worsen and lighten in waves. Flank pain is often a sign of problems with your urinary tract, such as a kidney stone or infection.     DISCHARGE INSTRUCTIONS:    Return to the emergency department if:   •You have a fever.       •Your heart is fluttering or jumping.       •You see blood in your urine.       •Your pain radiates into your lower abdomen and genital area.       •You have intense pain in your low back next to your spine.       •You are much more tired than usual and have no desire to eat.       •You have a headache and your muscles jerk.       Contact your healthcare provider if:   •You have an upset stomach and are vomiting.      •You have to urinate more often, and with urgency.       •Your pain worsens or does not improve, and you cannot get comfortable.       •You pass a stone when you urinate.      •You have questions or concerns about your condition or care.      Medicines: The following medicines may be ordered for you:  •Pain medicine may help decrease or relieve your pain. Do not wait until the pain is severe before you take your medicine.       •Antibiotics may help treat a urinary tract infection caused by bacteria.       •Take your medicine as directed. Contact your healthcare provider if you think your medicine is not helping or if you have side effects. Tell him of her if you are allergic to any medicine. Keep a list of the medicines, vitamins, and herbs you take. Include the amounts, and when and why you take them. Bring the list or the pill bottles to follow-up visits. Carry your medicine list with you in case of an emergency.      Follow up with your healthcare provider in 1 to 2 days or as directed: Write down your questions so you remember to ask them during your visits.

## 2021-12-15 NOTE — ED PROVIDER NOTE - OBJECTIVE STATEMENT
32 yo fem Hx ulcerative colitis, hx of urosepsis  c/o left flank pain x  one day.  worse with movement and deep breath, but no SOB.  no dysuria/freq/hematuria, diarrhea, bloody stool/melena, fever/chills, CP or SOB.    similar to past kidney infection.

## 2021-12-15 NOTE — ED PROVIDER NOTE - PHYSICAL EXAMINATION
CONSTITUTIONAL: NAD   SKIN: Normal color and turgor.    HEAD: NC/AT.  EYES: Conjunctiva clear. EOMI. PERRL.    ENT: Airway clear. Normal voice.   RESPIRATORY:  Normal work of breathing. Lungs CTAB.  CARDIOVASCULAR:  RRR, S1S2. No M/R/G.      GI:  Abdomen soft, nontender.    : + left CVAT  MSK: Neck supple.  No lower extremity edema or calf tenderness.  No joint swelling or ROM limitation.  NEURO: Alert and oriented; CN II-XII grossly intact. Speech clear. 5/5 strength in all extremities.  Good balance. Steady gait.

## 2021-12-15 NOTE — ED PROVIDER NOTE - NS ED ROS FT
CONSTITUTIONAL: No fever, chills, or weakness  NEURO: No headache, no dizziness, no syncope; No focal weakness/tingling/numbness  EYES: No visual changes  ENT: No rhinorrhea or sore throat  PULM: No cough or dyspnea  CV: No chest pain or palpitations  GI: No abdominal pain, vomiting, or diarrhea  : HPI  MSK: HPI  SKIN: no rash or unusual bruising

## 2021-12-15 NOTE — ED PROVIDER NOTE - CLINICAL SUMMARY MEDICAL DECISION MAKING FREE TEXT BOX
Left flank pain.  Hx UC and also hx of urosepsis due to pyelo.  Afeb, well-appearing in ED, HD stable.  No abd pain or tenderness.  Mild left CVAT.  Plan:  Labs incl CBC-diff, CMP, lipase, preg test, UA, CT scan abd-pelvis.

## 2021-12-15 NOTE — ED ADULT NURSE NOTE - OBJECTIVE STATEMENT
31 y F, complaining of flank pain that goes to her abdomen, pt states pain radiates to her flank, denies chest pain, sob, nausea.

## 2021-12-15 NOTE — ED PROVIDER NOTE - PROGRESS NOTE DETAILS
Lab and imaging results d/w pt and mother.  To follow up with PCP in morning.  Return precautions given, both expressed clear understanding.

## 2021-12-27 ENCOUNTER — TRANSCRIPTION ENCOUNTER (OUTPATIENT)
Age: 31
End: 2021-12-27

## 2022-01-04 NOTE — DIETITIAN INITIAL EVALUATION ADULT. - REASON INDICATOR FOR ASSESSMENT
Additional Notes: Patient consent was obtained to proceed with the visit and recommended plan of care after discussion of all risks and benefits, including the risks of COVID-19 exposure. Detail Level: Simple Render Risk Assessment In Note?: yes LOS

## 2022-10-10 ENCOUNTER — NON-APPOINTMENT (OUTPATIENT)
Age: 32
End: 2022-10-10

## 2022-11-21 ENCOUNTER — NON-APPOINTMENT (OUTPATIENT)
Age: 32
End: 2022-11-21

## 2023-01-30 ENCOUNTER — NON-APPOINTMENT (OUTPATIENT)
Age: 33
End: 2023-01-30

## 2023-02-13 NOTE — ED ADULT TRIAGE NOTE - PAIN: PRESENCE, MLM
complains of pain/discomfort [No] : The patient does not have visual impairment [TimeGetUpGo] : 15 [Independent] : using telephone [Some assistance needed] : shopping [Full assistance needed] : managing medications [] : managing finances [No falls in past year] : Patient reported no falls in the past year [Yes] : The patient has visual impairment [HRA Reviewed] : Health Risk Assessment reviewed [[facility]] : 30

## 2023-09-05 NOTE — PATIENT PROFILE ADULT - BRADEN NUTRITION
Problem: Anxiety  Goal: Will report anxiety at manageable levels  Description: INTERVENTIONS:  1. Administer medication as ordered  2. Teach and rehearse alternative coping skills  3. Provide emotional support with 1:1 interaction with staff  9/4/2023 2145 by Ashlyn Jorgensen RN  Outcome: Progressing     Problem: Coping  Goal: Pt/Family able to verbalize concerns and demonstrate effective coping strategies  Description: INTERVENTIONS:  1. Assist patient/family to identify coping skills, available support systems and cultural and spiritual values  2. Provide emotional support, including active listening and acknowledgement of concerns of patient and caregivers  3. Reduce environmental stimuli, as able  4. Instruct patient/family in relaxation techniques, as appropriate  5. Assess for spiritual pain/suffering and initiate Spiritual Care, Psychosocial Clinical Specialist consults as needed  9/4/2023 2145 by Ashlyn Jorgensen RN  Outcome: Progressing     Problem: Behavior  Goal: Pt/Family maintain appropriate behavior and adhere to behavioral management agreement, if implemented  Description: INTERVENTIONS:  1. Assess patient/family's coping skills and  non-compliant behavior (including use of illegal substances)  2. Notify security of behavior or suspected illegal substances which indicate the need for search of the family and/or belongings  3. Encourage verbalization of thoughts and concerns in a socially appropriate manner  4. Utilize positive, consistent limit setting strategies supporting safety of patient, staff and others  5. Encourage participation in the decision making process about the behavioral management agreement  6. If a visitor's behavior poses a threat to safety call refer to organization policy.   7. Initiate consult with , Psychosocial CNS, Spiritual Care as appropriate  9/4/2023 2145 by Ashlyn Jorgensen RN  Outcome: Progressing     Problem: Depression/Self Harm  Goal: Effect of psychiatric condition will be minimized and patient will be protected from self harm  Description: INTERVENTIONS:  1. Assess impact of patient's symptoms on level of functioning, self care needs and offer support as indicated  2. Assess patient/family knowledge of depression, impact on illness and need for teaching  3. Provide emotional support, presence and reassurance  4. Assess for possible suicidal thoughts or ideation. If patient expresses suicidal thoughts or statements do not leave alone, initiate Suicide Precautions, move to a room close to the nursing station and obtain sitter  5. Initiate consults as appropriate with Mental Health Professional, Spiritual Care, Psychosocial CNS, and consider a recommendation to the LIP for a Psychiatric Consultation  Outcome: Progressing     Problem: Self Harm/Suicidality  Goal: Will have no self-injury during hospital stay  Description: INTERVENTIONS:  1. Ensure constant observer at bedside with Q15M safety checks  2. Maintain a safe environment  3. Secure patient belongings  4. Ensure family/visitors adhere to safety recommendations  5. Ensure safety tray has been added to patient's diet order  6.   Every shift and PRN: Re-assess suicidal risk via Frequent Screener    Outcome: Progressing (4) excellent

## 2023-12-26 NOTE — ED ADULT TRIAGE NOTE - RESPIRATORY RATE (BREATHS/MIN)
Physical Therapy Discharge Summary    Name: Stevie Cummins  MRN: [id number]   Principal Problem: Cervical stenosis of spinal canal     Patient Discharged from acute Physical Therapy on .  Please refer to prior PT noted date on  for functional status.     Assessment:     Patient appropriate for care in another setting.    Objective:     GOALS:   Multidisciplinary Problems       Physical Therapy Goals          Problem: Physical Therapy    Goal Priority Disciplines Outcome Goal Variances Interventions   Physical Therapy Goal     PT, PT/OT Ongoing, Progressing     Description: Goals to be met by: Discharge      Patient will increase functional independence with mobility by performin. Supine to sit with Stand-by Assistance  2. Sit to supine with Stand-by Assistance  3. Sitting at edge of bed x 15 minutes with Stand-by Assistance without dizziness or low BP  4. Increased functional strength to 5/5 for B hips  5. Sit to stands to be completed at SBA using RW  6. bed<>chair transfers to be completed at CGA using RW.   7. Gait x 50 feet using RW at SBA                         Reasons for Discontinuation of Therapy Services  Transfer to alternate level of care.      Plan:     Patient Discharged to: Home with Home Health Service.      2023    
18

## 2024-04-18 ENCOUNTER — APPOINTMENT (OUTPATIENT)
Dept: DERMATOLOGY | Facility: CLINIC | Age: 34
End: 2024-04-18

## 2024-04-24 ENCOUNTER — APPOINTMENT (OUTPATIENT)
Dept: INTERNAL MEDICINE | Facility: CLINIC | Age: 34
End: 2024-04-24
Payer: COMMERCIAL

## 2024-04-24 VITALS
HEIGHT: 64 IN | SYSTOLIC BLOOD PRESSURE: 139 MMHG | WEIGHT: 150 LBS | TEMPERATURE: 98 F | DIASTOLIC BLOOD PRESSURE: 87 MMHG | BODY MASS INDEX: 25.61 KG/M2 | OXYGEN SATURATION: 96 % | HEART RATE: 100 BPM

## 2024-04-24 DIAGNOSIS — Z80.3 FAMILY HISTORY OF MALIGNANT NEOPLASM OF BREAST: ICD-10-CM

## 2024-04-24 DIAGNOSIS — F90.9 ATTENTION-DEFICIT HYPERACTIVITY DISORDER, UNSPECIFIED TYPE: ICD-10-CM

## 2024-04-24 DIAGNOSIS — F41.8 OTHER SPECIFIED ANXIETY DISORDERS: ICD-10-CM

## 2024-04-24 PROCEDURE — 36415 COLL VENOUS BLD VENIPUNCTURE: CPT

## 2024-04-24 PROCEDURE — 99385 PREV VISIT NEW AGE 18-39: CPT

## 2024-04-24 RX ORDER — VENLAFAXINE HCL 50 MG
TABLET ORAL
Refills: 0 | Status: ACTIVE | COMMUNITY

## 2024-04-24 RX ORDER — NORETHINDRONE ACETATE AND ETHINYL ESTRADIOL 1MG-20(24)
KIT ORAL
Refills: 0 | Status: ACTIVE | COMMUNITY

## 2024-04-24 RX ORDER — BUPROPION HYDROCHLORIDE 300 MG/1
300 TABLET, EXTENDED RELEASE ORAL
Refills: 0 | Status: ACTIVE | COMMUNITY

## 2024-04-24 RX ORDER — METHYLPHENIDATE HYDROCHLORIDE 27 MG/1
TABLET, EXTENDED RELEASE ORAL
Refills: 0 | Status: ACTIVE | COMMUNITY

## 2024-04-24 NOTE — HEALTH RISK ASSESSMENT
[No] : No [1 or 2 (0 pts)] : 1 or 2 (0 points) [Never (0 pts)] : Never (0 points) [Alone] : lives alone [Unemployed] : unemployed [Significant Other] : lives with significant other [Sexually Active] : sexually active [Never] : Never

## 2024-04-24 NOTE — ASSESSMENT
[FreeTextEntry1] : #HCM -pap smear last pap a year ago, referral GYN provided -flu shot, covid UTD   #Hair loss -has appt upcoming with derm -f/u bloodwork  #Depression with anxiety -continue Effexor, wellbutrin -following with pysch  #ADHD -continue concerta -following with psych

## 2024-04-24 NOTE — HISTORY OF PRESENT ILLNESS
[FreeTextEntry1] : CPE [de-identified] : 33 PMH ulcerative colitits, depression, anxiety, ADHD presents for CPE. Takes effexor 300, wellbutrin 300 and concerta 72. Pt following with psych for anxiety and depression. Last few months has been having a lot of hair loss, losing clumps of hair. Has appt upcoming with derm. Does a lot of stress in life

## 2024-04-25 LAB
25(OH)D3 SERPL-MCNC: 50.6 NG/ML
ALBUMIN SERPL ELPH-MCNC: 4.5 G/DL
ALP BLD-CCNC: 101 U/L
ALT SERPL-CCNC: 16 U/L
ANION GAP SERPL CALC-SCNC: 12 MMOL/L
AST SERPL-CCNC: 19 U/L
BASOPHILS # BLD AUTO: 0.05 K/UL
BASOPHILS NFR BLD AUTO: 0.6 %
BILIRUB SERPL-MCNC: 0.2 MG/DL
BUN SERPL-MCNC: 11 MG/DL
CALCIUM SERPL-MCNC: 9.3 MG/DL
CHLORIDE SERPL-SCNC: 104 MMOL/L
CHOLEST SERPL-MCNC: 197 MG/DL
CO2 SERPL-SCNC: 25 MMOL/L
CREAT SERPL-MCNC: 0.94 MG/DL
EGFR: 83 ML/MIN/1.73M2
EOSINOPHIL # BLD AUTO: 0.11 K/UL
EOSINOPHIL NFR BLD AUTO: 1.4 %
ESTIMATED AVERAGE GLUCOSE: 103 MG/DL
FOLATE SERPL-MCNC: 19.9 NG/ML
GLUCOSE SERPL-MCNC: 122 MG/DL
HBA1C MFR BLD HPLC: 5.2 %
HCT VFR BLD CALC: 46.6 %
HDLC SERPL-MCNC: 52 MG/DL
HGB BLD-MCNC: 14.8 G/DL
IMM GRANULOCYTES NFR BLD AUTO: 0.1 %
LDLC SERPL CALC-MCNC: 112 MG/DL
LYMPHOCYTES # BLD AUTO: 2.13 K/UL
LYMPHOCYTES NFR BLD AUTO: 26.6 %
MAN DIFF?: NORMAL
MCHC RBC-ENTMCNC: 28 PG
MCHC RBC-ENTMCNC: 31.8 GM/DL
MCV RBC AUTO: 88.1 FL
MONOCYTES # BLD AUTO: 0.46 K/UL
MONOCYTES NFR BLD AUTO: 5.7 %
NEUTROPHILS # BLD AUTO: 5.26 K/UL
NEUTROPHILS NFR BLD AUTO: 65.6 %
NONHDLC SERPL-MCNC: 145 MG/DL
PLATELET # BLD AUTO: 418 K/UL
POTASSIUM SERPL-SCNC: 4.6 MMOL/L
PROT SERPL-MCNC: 7.4 G/DL
RBC # BLD: 5.29 M/UL
RBC # FLD: 13.5 %
SODIUM SERPL-SCNC: 140 MMOL/L
TRIGL SERPL-MCNC: 191 MG/DL
TSH SERPL-ACNC: 2.83 UIU/ML
VIT B12 SERPL-MCNC: 414 PG/ML
WBC # FLD AUTO: 8.02 K/UL

## 2024-04-26 ENCOUNTER — APPOINTMENT (OUTPATIENT)
Dept: DERMATOLOGY | Facility: CLINIC | Age: 34
End: 2024-04-26
Payer: COMMERCIAL

## 2024-04-26 PROCEDURE — 99204 OFFICE O/P NEW MOD 45 MIN: CPT

## 2024-04-26 RX ORDER — VEDOLIZUMAB 300 MG/5ML
300 INJECTION, POWDER, LYOPHILIZED, FOR SOLUTION INTRAVENOUS
Refills: 0 | Status: ACTIVE | COMMUNITY

## 2024-04-26 RX ORDER — TRIAMCINOLONE ACETONIDE 1 MG/G
0.1 CREAM TOPICAL
Qty: 1 | Refills: 1 | Status: ACTIVE | COMMUNITY
Start: 2024-04-26 | End: 1900-01-01

## 2024-04-26 RX ORDER — KETOCONAZOLE 20.5 MG/ML
2 SHAMPOO, SUSPENSION TOPICAL
Qty: 1 | Refills: 5 | Status: ACTIVE | COMMUNITY
Start: 2024-04-26 | End: 1900-01-01

## 2024-04-26 NOTE — PHYSICAL EXAM
[Alert] : alert [Oriented x 3] : ~L oriented x 3 [Declined] : declined [FreeTextEntry3] : Focused exam: -negative hair pull test -thinning of hair on temporal scalp, with scaly patches -scaly pink patches on the hands

## 2024-04-26 NOTE — ASSESSMENT
[FreeTextEntry1] : #Telogen effluvium -chronic, flaring -discussed the etiology and natural course of the condition -the hair loss will resolve in 3-6 months and it is non-scarring, hair will regrow afterwards. -start using Rogaine 5% 1-2 x daily.  Discussed potential side effects of scalp irritation, pruritus, hypertrichosis, and the need for continued usage for sustained effect.  -can continue nutrafol prn -labs reviewed: wnl -can consider oral minoxidil at follow up   #Hand eczema -chronic, flaring -I have discussed the chronic nature and course of this condition -Start triamcinolone 0.1% cream BID to affected areas on the body until rash resolved. Do not use on face, axillae, groin. -gentle skin care, liberal emollients reviewed  #Seborrheic dermatitis - scalp -chronic, flaring -I have discussed the chronic nature and course of this condition -start ketoconazole 2% shampoo 2-3 times per week, leave on for 5-7 mins before rinsing out   RTC 2 months

## 2024-04-26 NOTE — HISTORY OF PRESENT ILLNESS
[FreeTextEntry1] : hair loss [de-identified] : 34yo F with hx of UC on vedolizumab, depression and anxiety on venlafaxine, wellbutrin, concerta, presents for evaluation of hair loss ongoing for a year, has many stressors in her life, lost her job a year ago, still looking for employment has noticed significant shedding which has been extremely stressful recently started nutrafol labs checked by PCP 4/24/24, reviewed: CBC, vit D, TSH, CMP wnl on OCPs also with eczema on the hand that comes and goes

## 2024-04-30 ENCOUNTER — APPOINTMENT (OUTPATIENT)
Dept: OBGYN | Facility: CLINIC | Age: 34
End: 2024-04-30

## 2024-05-06 ENCOUNTER — APPOINTMENT (OUTPATIENT)
Dept: OBGYN | Facility: CLINIC | Age: 34
End: 2024-05-06
Payer: COMMERCIAL

## 2024-05-06 ENCOUNTER — NON-APPOINTMENT (OUTPATIENT)
Age: 34
End: 2024-05-06

## 2024-05-06 VITALS — WEIGHT: 149 LBS | DIASTOLIC BLOOD PRESSURE: 80 MMHG | BODY MASS INDEX: 25.58 KG/M2 | SYSTOLIC BLOOD PRESSURE: 122 MMHG

## 2024-05-06 DIAGNOSIS — Z30.40 ENCOUNTER FOR SURVEILLANCE OF CONTRACEPTIVES, UNSPECIFIED: ICD-10-CM

## 2024-05-06 DIAGNOSIS — Z00.00 ENCOUNTER FOR GENERAL ADULT MEDICAL EXAMINATION W/OUT ABNORMAL FINDINGS: ICD-10-CM

## 2024-05-06 DIAGNOSIS — A74.9 CHLAMYDIAL INFECTION, UNSPECIFIED: ICD-10-CM

## 2024-05-06 DIAGNOSIS — B97.7 PAPILLOMAVIRUS AS THE CAUSE OF DISEASES CLASSIFIED ELSEWHERE: ICD-10-CM

## 2024-05-06 DIAGNOSIS — Z01.419 ENCOUNTER FOR GYNECOLOGICAL EXAMINATION (GENERAL) (ROUTINE) W/OUT ABNORMAL FINDINGS: ICD-10-CM

## 2024-05-06 DIAGNOSIS — Z12.4 ENCOUNTER FOR SCREENING FOR MALIGNANT NEOPLASM OF CERVIX: ICD-10-CM

## 2024-05-06 DIAGNOSIS — Z87.19 PERSONAL HISTORY OF OTHER DISEASES OF THE DIGESTIVE SYSTEM: ICD-10-CM

## 2024-05-06 PROCEDURE — 99385 PREV VISIT NEW AGE 18-39: CPT

## 2024-05-06 PROCEDURE — 99459 PELVIC EXAMINATION: CPT

## 2024-05-06 RX ORDER — NORETHINDRONE ACETATE AND ETHINYL ESTRADIOL 1MG-20(24)
1-20 KIT ORAL
Qty: 3 | Refills: 3 | Status: ACTIVE | COMMUNITY
Start: 2024-05-06 | End: 1900-01-01

## 2024-05-06 NOTE — PHYSICAL EXAM
[Chaperone Present] : A chaperone was present in the examining room during all aspects of the physical examination [14103] : A chaperone was present during the pelvic exam. [FreeTextEntry2] : Francie Hunt MA [Appropriately responsive] : appropriately responsive [Alert] : alert [No Acute Distress] : no acute distress [No Lymphadenopathy] : no lymphadenopathy [Soft] : soft [Non-tender] : non-tender [Non-distended] : non-distended [No HSM] : No HSM [No Mass] : no mass [Oriented x3] : oriented x3 [Examination Of The Breasts] : a normal appearance [No Discharge] : no discharge [No Masses] : no breast masses were palpable [No Lesions] : no lesions  [Labia Majora] : normal [Labia Minora] : normal [No Bleeding] : There was no active vaginal bleeding [Normal] : normal [Normal Position] : in a normal position [Tenderness] : nontender [Enlarged ___ wks] : not enlarged [Mass ___ cm] : no uterine mass was palpated [Uterine Adnexae] : normal [FreeTextEntry8] : no masses palpapted

## 2024-05-06 NOTE — PLAN
[FreeTextEntry1] : Pap Ordered Discussed Pap guidelines Discussed breast awareness and mammogram guidelines Mammogram due at 41 yo Contraception: OCPs Condoms for STI prevention No UPT- patient states that she is on current pack Take a pregnancy test if you have s/s of pregnancy ACHES reviewed with understanding F/U pending results RTC for routine GYN exam in one year or PRN Patient verbalized understanding and is in agreement with plan

## 2024-05-06 NOTE — HISTORY OF PRESENT ILLNESS
[Oral Contraceptive] : uses oral contraception pills [Y] : Patient is sexually active [Monogamous (Male Partner)] : is monogamous with a male partner [N] : Patient denies prior pregnancies [Normal Amount/Duration] :  normal amount and duration [Regular Cycle Intervals] : periods have been regular [Currently Active] : currently active [Men] : men [No] : No [Yes] : Yes [LMPDate] : 5/3/24 [MensesFreq] : 28 [MensesLength] : 2-4 [FreeTextEntry1] : 5/3/24 [Vaginal] : vaginal [FreeTextEntry3] : PILLS

## 2024-05-08 LAB
CYTOLOGY CVX/VAG DOC THIN PREP: NORMAL
HPV HIGH+LOW RISK DNA PNL CVX: NOT DETECTED

## 2024-06-28 ENCOUNTER — APPOINTMENT (OUTPATIENT)
Dept: DERMATOLOGY | Facility: CLINIC | Age: 34
End: 2024-06-28
Payer: COMMERCIAL

## 2024-06-28 DIAGNOSIS — L21.9 SEBORRHEIC DERMATITIS, UNSPECIFIED: ICD-10-CM

## 2024-06-28 DIAGNOSIS — L65.0 TELOGEN EFFLUVIUM: ICD-10-CM

## 2024-06-28 DIAGNOSIS — L20.9 ATOPIC DERMATITIS, UNSPECIFIED: ICD-10-CM

## 2024-06-28 DIAGNOSIS — L65.9 NONSCARRING HAIR LOSS, UNSPECIFIED: ICD-10-CM

## 2024-06-28 PROCEDURE — 99214 OFFICE O/P EST MOD 30 MIN: CPT

## 2025-03-24 ENCOUNTER — APPOINTMENT (OUTPATIENT)
Dept: INTERNAL MEDICINE | Facility: CLINIC | Age: 35
End: 2025-03-24
Payer: MEDICAID

## 2025-03-24 ENCOUNTER — NON-APPOINTMENT (OUTPATIENT)
Age: 35
End: 2025-03-24

## 2025-03-24 VITALS
SYSTOLIC BLOOD PRESSURE: 120 MMHG | DIASTOLIC BLOOD PRESSURE: 79 MMHG | WEIGHT: 152.25 LBS | OXYGEN SATURATION: 96 % | TEMPERATURE: 98.1 F | HEART RATE: 80 BPM | HEIGHT: 64 IN | BODY MASS INDEX: 25.99 KG/M2

## 2025-03-24 DIAGNOSIS — E66.3 OVERWEIGHT: ICD-10-CM

## 2025-03-24 DIAGNOSIS — F41.8 OTHER SPECIFIED ANXIETY DISORDERS: ICD-10-CM

## 2025-03-24 DIAGNOSIS — F90.9 ATTENTION-DEFICIT HYPERACTIVITY DISORDER, UNSPECIFIED TYPE: ICD-10-CM

## 2025-03-24 PROCEDURE — G2211 COMPLEX E/M VISIT ADD ON: CPT | Mod: NC

## 2025-03-24 PROCEDURE — 99214 OFFICE O/P EST MOD 30 MIN: CPT

## 2025-03-24 RX ORDER — METHYLPHENIDATE HYDROCHLORIDE 54 MG/1
54 TABLET, EXTENDED RELEASE ORAL DAILY
Qty: 30 | Refills: 0 | Status: ACTIVE | COMMUNITY
Start: 2025-03-24 | End: 1900-01-01

## 2025-03-24 RX ORDER — NALTREXONE HYDROCHLORIDE 50 MG/1
50 TABLET, FILM COATED ORAL DAILY
Qty: 90 | Refills: 1 | Status: ACTIVE | COMMUNITY
Start: 2025-03-24 | End: 1900-01-01

## 2025-03-24 RX ORDER — VENLAFAXINE HYDROCHLORIDE 150 MG/1
150 CAPSULE, EXTENDED RELEASE ORAL DAILY
Qty: 180 | Refills: 0 | Status: ACTIVE | COMMUNITY
Start: 2025-03-24 | End: 1900-01-01

## 2025-03-24 RX ORDER — METHYLPHENIDATE HYDROCHLORIDE 18 MG/1
18 TABLET, EXTENDED RELEASE ORAL DAILY
Qty: 30 | Refills: 0 | Status: ACTIVE | COMMUNITY
Start: 2025-03-24 | End: 1900-01-01

## 2025-05-01 ENCOUNTER — APPOINTMENT (OUTPATIENT)
Dept: OPHTHALMOLOGY | Facility: CLINIC | Age: 35
End: 2025-05-01

## 2025-05-23 ENCOUNTER — APPOINTMENT (OUTPATIENT)
Dept: OPHTHALMOLOGY | Facility: CLINIC | Age: 35
End: 2025-05-23
Payer: MEDICAID

## 2025-05-23 ENCOUNTER — NON-APPOINTMENT (OUTPATIENT)
Age: 35
End: 2025-05-23

## 2025-05-23 PROCEDURE — 92250 FUNDUS PHOTOGRAPHY W/I&R: CPT

## 2025-05-23 PROCEDURE — 92004 COMPRE OPH EXAM NEW PT 1/>: CPT

## 2025-06-09 NOTE — ED ADULT NURSE NOTE - NS ED NOTE ABUSE RESPONSE YN
Donna Lim is calling to request a refill on the following medication(s):    Last Visit Date (If Applicable):  8/26/2024    Next Visit Date:    8/26/2025    Medication Request:  Requested Prescriptions     Pending Prescriptions Disp Refills    potassium chloride (MICRO-K) 10 MEQ extended release capsule [Pharmacy Med Name: Potassium Chloride ER Oral Capsule Extended Release 10 MEQ] 60 capsule 0     Sig: TAKE 1 CAPSULE BY MOUTH 2 TIMES A DAY               
Yes

## 2025-06-18 ENCOUNTER — RX RENEWAL (OUTPATIENT)
Age: 35
End: 2025-06-18

## 2025-08-05 ENCOUNTER — APPOINTMENT (OUTPATIENT)
Dept: OBGYN | Facility: CLINIC | Age: 35
End: 2025-08-05
Payer: MEDICAID

## 2025-08-05 VITALS
BODY MASS INDEX: 23.69 KG/M2 | DIASTOLIC BLOOD PRESSURE: 80 MMHG | OXYGEN SATURATION: 98 % | WEIGHT: 138 LBS | SYSTOLIC BLOOD PRESSURE: 128 MMHG | HEART RATE: 72 BPM

## 2025-08-05 PROCEDURE — 99459 PELVIC EXAMINATION: CPT

## 2025-08-05 PROCEDURE — 99395 PREV VISIT EST AGE 18-39: CPT
